# Patient Record
Sex: FEMALE | Race: WHITE | NOT HISPANIC OR LATINO | Employment: OTHER | URBAN - METROPOLITAN AREA
[De-identification: names, ages, dates, MRNs, and addresses within clinical notes are randomized per-mention and may not be internally consistent; named-entity substitution may affect disease eponyms.]

---

## 2017-02-03 ENCOUNTER — HOSPITAL ENCOUNTER (OUTPATIENT)
Dept: MAMMOGRAPHY | Facility: HOSPITAL | Age: 47
Discharge: HOME | End: 2017-02-03
Attending: OBSTETRICS & GYNECOLOGY | Admitting: OBSTETRICS & GYNECOLOGY

## 2017-02-03 DIAGNOSIS — Z12.31 VISIT FOR SCREENING MAMMOGRAM: ICD-10-CM

## 2017-02-03 PROCEDURE — G0202 SCR MAMMO BI INCL CAD: HCPCS

## 2017-02-03 PROCEDURE — 77063 BREAST TOMOSYNTHESIS BI: CPT

## 2017-02-03 PROCEDURE — 77067 SCR MAMMO BI INCL CAD: CPT | Performed by: RADIOLOGY

## 2017-02-03 PROCEDURE — 77063 BREAST TOMOSYNTHESIS BI: CPT | Performed by: RADIOLOGY

## 2017-03-03 ENCOUNTER — TRANSCRIBE ORDERS (OUTPATIENT)
Dept: ADMINISTRATIVE | Facility: HOSPITAL | Age: 47
End: 2017-03-03

## 2017-03-03 DIAGNOSIS — Z12.31 VISIT FOR SCREENING MAMMOGRAM: Primary | ICD-10-CM

## 2018-02-09 ENCOUNTER — HOSPITAL ENCOUNTER (OUTPATIENT)
Dept: MAMMOGRAPHY | Facility: HOSPITAL | Age: 48
Discharge: HOME | End: 2018-02-09
Attending: OBSTETRICS & GYNECOLOGY | Admitting: OBSTETRICS & GYNECOLOGY

## 2018-02-09 DIAGNOSIS — Z12.31 VISIT FOR SCREENING MAMMOGRAM: ICD-10-CM

## 2018-02-09 PROCEDURE — 77063 BREAST TOMOSYNTHESIS BI: CPT | Performed by: RADIOLOGY

## 2018-02-09 PROCEDURE — 77067 SCR MAMMO BI INCL CAD: CPT | Performed by: RADIOLOGY

## 2018-02-09 PROCEDURE — 77063 BREAST TOMOSYNTHESIS BI: CPT

## 2018-02-09 PROCEDURE — 77067 SCR MAMMO BI INCL CAD: CPT

## 2018-12-12 ENCOUNTER — TRANSCRIBE ORDERS (OUTPATIENT)
Dept: ADMINISTRATIVE | Facility: HOSPITAL | Age: 48
End: 2018-12-12

## 2018-12-12 DIAGNOSIS — Z12.31 VISIT FOR SCREENING MAMMOGRAM: Primary | ICD-10-CM

## 2019-03-01 ENCOUNTER — HOSPITAL ENCOUNTER (OUTPATIENT)
Dept: MAMMOGRAPHY | Facility: HOSPITAL | Age: 49
Discharge: HOME | End: 2019-03-01
Attending: OBSTETRICS & GYNECOLOGY | Admitting: OBSTETRICS & GYNECOLOGY

## 2019-03-01 DIAGNOSIS — Z12.31 VISIT FOR SCREENING MAMMOGRAM: ICD-10-CM

## 2019-03-01 PROCEDURE — 77067 SCR MAMMO BI INCL CAD: CPT

## 2019-03-01 PROCEDURE — 77063 BREAST TOMOSYNTHESIS BI: CPT | Performed by: RADIOLOGY

## 2019-03-01 PROCEDURE — 77063 BREAST TOMOSYNTHESIS BI: CPT

## 2019-03-01 PROCEDURE — 77067 SCR MAMMO BI INCL CAD: CPT | Performed by: RADIOLOGY

## 2020-02-03 ENCOUNTER — TRANSCRIBE ORDERS (OUTPATIENT)
Dept: ADMINISTRATIVE | Facility: HOSPITAL | Age: 50
End: 2020-02-03

## 2020-02-03 DIAGNOSIS — Z12.31 VISIT FOR SCREENING MAMMOGRAM: Primary | ICD-10-CM

## 2020-05-06 ENCOUNTER — APPOINTMENT (OUTPATIENT)
Dept: MAMMOGRAPHY | Facility: HOSPITAL | Age: 50
End: 2020-05-06

## 2020-10-12 ENCOUNTER — HOSPITAL ENCOUNTER (OUTPATIENT)
Dept: MAMMOGRAPHY | Facility: HOSPITAL | Age: 50
Discharge: HOME | End: 2020-10-12
Admitting: OBSTETRICS & GYNECOLOGY

## 2020-10-12 DIAGNOSIS — Z12.31 VISIT FOR SCREENING MAMMOGRAM: ICD-10-CM

## 2020-10-12 PROCEDURE — 77063 BREAST TOMOSYNTHESIS BI: CPT

## 2020-10-12 PROCEDURE — 77067 SCR MAMMO BI INCL CAD: CPT

## 2020-10-12 PROCEDURE — 77063 BREAST TOMOSYNTHESIS BI: CPT | Performed by: RADIOLOGY

## 2020-10-12 PROCEDURE — 77067 SCR MAMMO BI INCL CAD: CPT | Performed by: RADIOLOGY

## 2020-11-09 ENCOUNTER — OFFICE VISIT (OUTPATIENT)
Dept: NEUROSURGERY | Facility: CLINIC | Age: 50
End: 2020-11-09

## 2020-11-09 VITALS
WEIGHT: 243.6 LBS | DIASTOLIC BLOOD PRESSURE: 82 MMHG | BODY MASS INDEX: 36.08 KG/M2 | TEMPERATURE: 97.3 F | SYSTOLIC BLOOD PRESSURE: 114 MMHG | HEIGHT: 69 IN

## 2020-11-09 DIAGNOSIS — M51.26 HERNIATED LUMBAR INTERVERTEBRAL DISC: Primary | ICD-10-CM

## 2020-11-09 PROCEDURE — 99203 OFFICE O/P NEW LOW 30 MIN: CPT | Performed by: NEUROLOGICAL SURGERY

## 2020-11-09 RX ORDER — ESTRADIOL 1 MG/1
TABLET ORAL
COMMUNITY

## 2020-11-09 RX ORDER — NABUMETONE 750 MG/1
750 TABLET, FILM COATED ORAL 2 TIMES DAILY
Qty: 60 TABLET | Refills: 0 | Status: SHIPPED | OUTPATIENT
Start: 2020-11-09 | End: 2020-11-09

## 2020-11-09 RX ORDER — VENLAFAXINE 75 MG/1
TABLET ORAL
COMMUNITY
End: 2022-09-15

## 2020-11-09 RX ORDER — METHOCARBAMOL 750 MG/1
750 TABLET, FILM COATED ORAL 2 TIMES DAILY
Qty: 60 TABLET | Refills: 0 | Status: SHIPPED | OUTPATIENT
Start: 2020-11-09 | End: 2020-11-30 | Stop reason: SDUPTHER

## 2020-11-09 RX ORDER — BUSPIRONE HYDROCHLORIDE 5 MG/1
5 TABLET ORAL 3 TIMES DAILY
COMMUNITY

## 2020-11-09 RX ORDER — FEXOFENADINE HCL AND PSEUDOEPHEDRINE HCI 180; 240 MG/1; MG/1
1 TABLET, EXTENDED RELEASE ORAL
COMMUNITY
End: 2021-09-09 | Stop reason: SDUPTHER

## 2020-11-09 RX ORDER — LEVOTHYROXINE SODIUM 25 MCG
50 TABLET ORAL DAILY
COMMUNITY
Start: 2020-09-04 | End: 2021-02-15

## 2020-11-09 RX ORDER — NABUMETONE 750 MG/1
750 TABLET, FILM COATED ORAL 2 TIMES DAILY
Qty: 60 TABLET | Refills: 0 | Status: SHIPPED | OUTPATIENT
Start: 2020-11-09 | End: 2020-11-30 | Stop reason: SDUPTHER

## 2020-11-09 RX ORDER — METHOCARBAMOL 750 MG/1
750 TABLET, FILM COATED ORAL 2 TIMES DAILY
Qty: 60 TABLET | Refills: 0 | Status: SHIPPED | OUTPATIENT
Start: 2020-11-09 | End: 2020-11-09

## 2020-11-09 NOTE — PROGRESS NOTES
Dusty Zepeda  1970  [id number]      Chief Complaint   Patient presents with   • Back Pain     Bilateral   • Leg Pain     RLE   • Numbness     right knee   • Extremity Weakness     right knee       HISTORY OF PRESENT ILLNESS: This is a 50-year-old female with a 6-week history of pain in her back rating to her right leg and lateral aspect of her right knee.  She has had some issues with her knee in the past; has been to physical therapy.  However within the past 6 weeks she developed severe radiculopathy from her back rating to the lateral side of the knee.  It is worse with bending flexing.  She has been recently moving requiring packing and lifting of boxes, etc.  Her symptoms have worsened.    Lumbar MRI was performed and she is referred for neurosurgical consultation.    Past Medical History:   Diagnosis Date   • Arthritis    • Hyperthyroidism    • Low back pain        Past Surgical History:   Procedure Laterality Date   • HYSTERECTOMY     • THYROID LOBECTOMY         Family History   Problem Relation Age of Onset   • Thyroid disease Mother    • Asthma Father    • Breast cancer Neg Hx    • Ovarian cancer Neg Hx        Social History     Socioeconomic History   • Marital status:      Spouse name: Not on file   • Number of children: Not on file   • Years of education: Not on file   • Highest education level: Not on file   Tobacco Use   • Smoking status: Never Smoker   • Smokeless tobacco: Never Used   Substance and Sexual Activity   • Alcohol use: Yes   • Drug use: Never   • Sexual activity: Defer       No Known Allergies      Current Outpatient Medications:   •  busPIRone (BUSPAR) 5 MG tablet, Take 5 mg by mouth 3 (Three) Times a Day., Disp: , Rfl:   •  estradiol (ESTRACE) 1 MG tablet, estradiol 1 mg tablet, Disp: , Rfl:   •  fexofenadine-pseudoephedrine (ALLEGRA-D 24) 180-240 MG per 24 hr tablet, Take 1 tablet by mouth., Disp: , Rfl:   •  Synthroid 25 MCG tablet, 50 mcg Daily., Disp: , Rfl:   •   venlafaxine (EFFEXOR) 75 MG tablet, venlafaxine 75 mg tablet  Take 1 tablet every day by oral route., Disp: , Rfl:     Review of Systems   Constitutional: Negative for appetite change, chills, diaphoresis, fatigue, fever and unexpected weight change.   HENT: Positive for postnasal drip. Negative for congestion, dental problem, drooling, ear discharge, ear pain, facial swelling, hearing loss, mouth sores, nosebleeds, rhinorrhea, sinus pressure, sneezing, sore throat, tinnitus, trouble swallowing and voice change.    Eyes: Negative for photophobia, pain, discharge, redness, itching and visual disturbance.   Respiratory: Negative for apnea, cough, choking, chest tightness, shortness of breath, wheezing and stridor.    Cardiovascular: Negative for chest pain, palpitations and leg swelling.   Gastrointestinal: Negative for abdominal distention, abdominal pain, anal bleeding, blood in stool, constipation, diarrhea, nausea, rectal pain and vomiting.   Endocrine: Negative for cold intolerance, heat intolerance, polydipsia, polyphagia and polyuria.   Genitourinary: Negative for decreased urine volume, difficulty urinating, dysuria, enuresis, flank pain, frequency, genital sores, hematuria and urgency.   Musculoskeletal: Positive for back pain and joint swelling. Negative for arthralgias, gait problem, neck pain and neck stiffness.   Skin: Negative for color change, pallor, rash and wound.   Allergic/Immunologic: Negative for environmental allergies, food allergies and immunocompromised state.   Neurological: Negative for dizziness, tremors, seizures, syncope, facial asymmetry, speech difficulty, weakness, light-headedness, numbness and headaches.   Hematological: Negative for adenopathy. Does not bruise/bleed easily.   Psychiatric/Behavioral: Negative for agitation, behavioral problems, confusion, decreased concentration, dysphoric mood, hallucinations, self-injury, sleep disturbance and suicidal ideas. The patient is not  "nervous/anxious and is not hyperactive.    All other systems reviewed and are negative.      Vitals:    11/09/20 0952   BP: 114/82   BP Location: Right arm   Patient Position: Sitting   Cuff Size: Adult   Temp: 97.3 °F (36.3 °C)   TempSrc: Infrared   Weight: 110 kg (243 lb 9.6 oz)   Height: 175.3 cm (69\")       Neurological Examination:  Mental status/speech: The patient is alert and oriented.  Speech is clear without aphysia or dysarthria.  No overt cognitive deficits.    Cranial nerve examination:    Olfaction: Smell is intact.  Vision: Vision is intact without visual field abnormalities.  Funduscopic examination is normal.  No pupillary irregularity.  Ocular motor examination: The extraocular muscles are intact.  There is no diplopia.  The pupil is round and reactive to both light and accommodation.  There is no nystagmus.  Facial movement/sensation: There is no facial weakness.  Sensation is intact in the first, second, and third divisions of the trigeminal nerve.  The corneal reflex is intact.  Auditory: Hearing is intact to finger rub bilaterally.  Cranial nerves IX, X, XI, XII: Phonation is normal.  No dysphagia.  Tongue is protruded in the midline without atrophy.  The gag reflex is intact.  Shoulder shrug is normal.    Musculoligamentous ligamentous examination: BMI 35.9; weight 243 pounds.  Limited range of motion lumbar spine.  Straight leg raising, Lasègue and flip test negative.  There is no weakness or sensory loss.  The right patellar reflex is diminished as compared to the left which is easily elicitable.  Gait is normal.          Medical Decision Making:     Diagnostic Data Set: Lumbar MRI appears to have mild disc degeneration however at L4-L5 I think she has an extreme lateral disc protrusion on the right.      Assessment: Far lateral disc extrusion L4-L5          Recommendations: Send her to physical therapy; provided prescription of Relafen 750 mg twice daily singular Robaxin-750 milligrams 3 " times daily and will ask her to call me in 2 weeks.  At that time she is not better I would recommend a facet block or epidural steroid injection.  Should that fail she would require a acro endoscopic discectomy L4-L5 from the right.        I greatly appreciate the opportunity to see and evaluate this individual.  If you have questions or concerns regarding issues that I may have overlooked please call me at any time: [phone].  Bakari Sommers M.D.  [facility]  [address] [place] [address]  [phone]

## 2020-11-09 NOTE — PATIENT INSTRUCTIONS
Call Dr. Sommers on a Monday or Tuesday (ask for Lindsey or Liv) and leave a message.     Dr. Sommers will call you back at the end of the day as soon as he can.     [phone] Lindsey    [phone] Liv Sierra    Call 2-3 weeks

## 2020-11-11 NOTE — PROGRESS NOTES
[facility]        Subjective     Pain of the Right Knee      HPI    Dusty Zepeda is a 50 y.o. female who presents with new problem of: right knee pain.  Onset: atraumatic and gradual in nature. The issue has been ongoing for 1.5 month(s). Pain is a 5/10 on the pain scale. Pain is described as burning and stabbing. Associated symptoms include pain, popping, grinding, stiffness and giving way/buckling. The pain is worse with walking, standing and climbing stairs; pain medication and/or NSAID and lying down improve the pain. Previous treatments have included: NSAIDS and physical therapy.    I have reviewed the following portions of the patient's history:History of Present Illness and review of systems.    Patient is here today at the request of Dr. Sommers and Dr. Hernandez for evaluation of right lower extremity pain.  Patient has sciatica and sees Dr. Sommers for that.  Patient also has some lateral sided hip pain that radiates down the leg to the knee.  There is some numbness at the anterolateral aspect of the proximal leg.  Patient has difficulty with kneeling and squatting.  There is cracking and popping as well.  She is here for further evaluation and treatment.  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      Past Medical History:   Diagnosis Date   • Arthritis    • Hyperthyroidism    • Low back pain       Past Surgical History:   Procedure Laterality Date   • HYSTERECTOMY     • THYROID LOBECTOMY        Family History   Problem Relation Age of Onset   • Thyroid disease Mother    • Asthma Father    • Breast cancer Neg Hx    • Ovarian cancer Neg Hx      Social History     Socioeconomic History   • Marital status:      Spouse name: Not on file   • Number of children: Not on file   • Years of education: Not on file   • Highest education level: Not on file    "  Tobacco Use   • Smoking status: Never Smoker   • Smokeless tobacco: Never Used   Substance and Sexual Activity   • Alcohol use: Yes   • Drug use: Never   • Sexual activity: Defer      Current Outpatient Medications on File Prior to Visit   Medication Sig Dispense Refill   • busPIRone (BUSPAR) 5 MG tablet Take 5 mg by mouth 3 (Three) Times a Day.     • estradiol (ESTRACE) 1 MG tablet estradiol 1 mg tablet     • fexofenadine-pseudoephedrine (ALLEGRA-D 24) 180-240 MG per 24 hr tablet Take 1 tablet by mouth.     • methocarbamol (ROBAXIN) 750 MG tablet Take 1 tablet by mouth 2 (two) times a day for 30 days. 60 tablet 0   • nabumetone (RELAFEN) 750 MG tablet Take 1 tablet by mouth 2 (Two) Times a Day. 60 tablet 0   • Synthroid 25 MCG tablet 50 mcg Daily.     • venlafaxine (EFFEXOR) 75 MG tablet venlafaxine 75 mg tablet   Take 1 tablet every day by oral route.       No current facility-administered medications on file prior to visit.       No Known Allergies       Review of Systems   Constitutional: Negative.    HENT: Negative.    Eyes: Negative.    Respiratory: Negative.    Cardiovascular: Negative.    Gastrointestinal: Negative.    Endocrine: Negative.    Genitourinary: Negative.    Musculoskeletal: Positive for arthralgias, back pain and neck stiffness.   Skin: Negative.    Allergic/Immunologic: Negative.    Neurological: Negative.    Hematological: Negative.    Psychiatric/Behavioral: Negative.         I reviewed the patient's chief complaint, history of present illness, review of systems, past medical history, surgical history, family history, social history, medications and allergy list.        Objective      Physical Exam  Pulse 95   Ht 175.3 cm (69.02\")   Wt 110 kg (242 lb 8.1 oz)   SpO2 96%   BMI 35.80 kg/m²     Body mass index is 35.8 kg/m².    General  Mental Status - alert  General Appearance - cooperative, well groomed, not in acute distress  Orientation - Oriented X3  Build & Nutrition - well developed " and well nourished  Posture - normal posture  Gait - normal gait     Integumentary  Global Assessment  Examination of related systems reveals - no lymphadenopathy  Ears:  No abnormality  Nose:  No mucous drainage  General Characteristics  Overall examination of the patient's skin reveals - no rashes, no evidence of scars, no suspicious lesions and no bruises.  Color - normal coloration of skin.  Vascular: Brisk capillary refill in all extremities    Ortho Exam  Peripheral Vascular  Lower Extremity   Edema - None bilaterally    Musculoskeletal  Lower Extremity   Left Hip    Normal range of motion    No crepitus, instability, subluxation or laxity    No known fractures or dislocations   Right Hip    Normal range of motion    No crepitus, instability, subluxation or laxity    No known fractures or dislocations     Inspection and palpation    Tenderness -      Right - over greater trochanter     Swelling - none bilaterally    Tissue tension/texture is pliable and soft bilaterally     Normal warmth bilaterally   Strength and Tone    Left hip flexors - 5/5     Right hip flexors - 5/5   Deformities/Postures/Misalignments/Discrepancies    No leg length discrepancy   Functional Testing    Stinchfield test positive bilaterally    90-90 straight leg raise negative bilaterally    Mary's test:  positive      Peripheral Vascular:    Upper Extremity:   Inspection:  Left--no cyanotic nail beds Right--no cyanotic nail beds   Bilateral:  Pink nail beds with brisk capillary refill   Palpation:  Bilateral radial pulse normal    Musculoskeletal:  Global Assessment:  Overall assessment of Lower Extremity Muscle Strength and Tone:  Right quadriceps--5/5   Right hamstrings--5/5       Right tibialis anterior--5/5  Right gastroc-soleus--5/5  Right EHL --5/5    Lower Extremity:  Knee/Patella:  No digital clubbing or cyanosis.    Examination of right knee reveals:  Normal deep tendon reflexes, coordination, strength, tone, sensation.  No known  fractures or deformities.    Inspection and Palpation:  Right knee:  Tenderness:  Over the medial and lateral joint line and moderate severity  Effusion:  1+  Crepitus:  Positive  Pulses:  2+  Ecchymosis:  None  Warmth:  None     ROM:  Right:  Extension: 5    Flexion:120    Instability:    Right:  Lachman Test:  Negative, Varus stress test negative, Valgus stress test negative    Deformities/Malalignments/Discrepancies:    Left:  No deformities   Right:  Genu Varum    Functional Testing:  Michelle's test:  Negative  Patella grind test:  Positive  Q-angle:  normal            Imaging/Studies  Imaging Results (Last 24 Hours)     Procedure Component Value Units Date/Time    XR Knee 4+ View Right [[id number]] Resulted: 11/12/20 0900     Updated: 11/12/20 0900    Narrative:      Knee X-Ray    Indication: Pain    Study:  Upright AP, Skiers, Lateral, and Sunrise views of Right knee(s)    Comparison: None    Findings:    Patient appears to have mild degenerative changes in the medial   compartment.    There are mild degenerative changes in the lateral compartment.    There are moderate early severe changes in the patellofemoral compartment.      Patient has overall neutral to slight varus alignment.        Impression:   Mild lateral and medial compartment and moderate to early severe   patellofemoral compartment degnerative changes of the knee                 Assessment    Assessment:  1. Right knee pain, unspecified chronicity    2. Primary osteoarthritis of right knee    3. Trochanteric bursitis of right hip        Plan:  1. Continue over-the-counter medication as needed for discomfort  2. Right lower extremity pain with trochanteric bursitis--observe for now.  Certainly lateral sided knee pain can be a manifestation of this condition.  I recommended observation for now and modalities to the trochanteric bursa in about 6 weeks if she does not get any relief from her knee injection  3. Right knee  arthritis--patellofemoral--diagnostic therapeutic injection will be given into the right knee today.  Follow-up in 6 weeks and we will assess efficacy.  If she is no better, consider modalities to the trochanteric bursa.    Procedure Note:    I discussed with the patient the potential benefits of performing a therapeutic injections as well as potential risks including but not limited to infection, swelling, pain, bleeding, bruising, nerve/vessel damage, skin color changes, transient elevation in blood glucose levels, and fat atrophy. After informed consent and after the areas were prepped with chlorhexadine soap, ethyl chloride was used to numb the skin. Via the anterolateral approach, 3mL of 1% lidocaine followed by 40mg of Kenalog were each injected into the right knee joint. The patient tolerated the procedure well. There were no complications. A sterile dressing was placed over the injection sites.          Nadir Moe MD  11/12/20  09:11 SINDHU Mike disclaimer:  Much of this encounter note is an electronic transcription/translation of spoken language to printed text. The electronic translation of spoken language may permit erroneous, or at times, nonsensical words or phrases to be inadvertently transcribed; Although I have reviewed the note for such errors, some may still exist.

## 2020-11-12 ENCOUNTER — OFFICE VISIT (OUTPATIENT)
Dept: ORTHOPEDIC SURGERY | Facility: CLINIC | Age: 50
End: 2020-11-12

## 2020-11-12 VITALS — OXYGEN SATURATION: 96 % | WEIGHT: 242.51 LBS | HEART RATE: 95 BPM | BODY MASS INDEX: 35.92 KG/M2 | HEIGHT: 69 IN

## 2020-11-12 DIAGNOSIS — M70.61 TROCHANTERIC BURSITIS OF RIGHT HIP: ICD-10-CM

## 2020-11-12 DIAGNOSIS — M17.11 PRIMARY OSTEOARTHRITIS OF RIGHT KNEE: ICD-10-CM

## 2020-11-12 DIAGNOSIS — M25.561 RIGHT KNEE PAIN, UNSPECIFIED CHRONICITY: Primary | ICD-10-CM

## 2020-11-12 PROCEDURE — 20610 DRAIN/INJ JOINT/BURSA W/O US: CPT | Performed by: ORTHOPAEDIC SURGERY

## 2020-11-12 PROCEDURE — 99204 OFFICE O/P NEW MOD 45 MIN: CPT | Performed by: ORTHOPAEDIC SURGERY

## 2020-11-12 RX ORDER — TRIAMCINOLONE ACETONIDE 40 MG/ML
40 INJECTION, SUSPENSION INTRA-ARTICULAR; INTRAMUSCULAR
Status: COMPLETED | OUTPATIENT
Start: 2020-11-12 | End: 2020-11-12

## 2020-11-12 RX ORDER — LIDOCAINE HYDROCHLORIDE 10 MG/ML
3 INJECTION, SOLUTION EPIDURAL; INFILTRATION; INTRACAUDAL; PERINEURAL
Status: COMPLETED | OUTPATIENT
Start: 2020-11-12 | End: 2020-11-12

## 2020-11-12 RX ADMIN — LIDOCAINE HYDROCHLORIDE 3 ML: 10 INJECTION, SOLUTION EPIDURAL; INFILTRATION; INTRACAUDAL; PERINEURAL at 09:11

## 2020-11-12 RX ADMIN — TRIAMCINOLONE ACETONIDE 40 MG: 40 INJECTION, SUSPENSION INTRA-ARTICULAR; INTRAMUSCULAR at 09:11

## 2020-11-12 NOTE — PROGRESS NOTES
Procedure   Large Joint Arthrocentesis: R knee  Date/Time: 11/12/2020 9:11 AM  Consent given by: patient  Site marked: site marked  Timeout: Immediately prior to procedure a time out was called to verify the correct patient, procedure, equipment, support staff and site/side marked as required   Supporting Documentation  Indications: pain   Procedure Details  Location: knee - R knee  Preparation: Patient was prepped and draped in the usual sterile fashion  Needle size: 25 G  Approach: anterolateral  Medications administered: 40 mg triamcinolone acetonide 40 MG/ML; 3 mL lidocaine PF 1% 1 %  Patient tolerance: patient tolerated the procedure well with no immediate complications

## 2020-11-30 ENCOUNTER — TELEPHONE (OUTPATIENT)
Dept: NEUROSURGERY | Facility: CLINIC | Age: 50
End: 2020-11-30

## 2020-11-30 RX ORDER — METHOCARBAMOL 750 MG/1
750 TABLET, FILM COATED ORAL 2 TIMES DAILY
Qty: 60 TABLET | Refills: 0 | Status: SHIPPED | OUTPATIENT
Start: 2020-11-30 | End: 2020-12-30

## 2020-11-30 RX ORDER — NABUMETONE 750 MG/1
750 TABLET, FILM COATED ORAL 2 TIMES DAILY
Qty: 60 TABLET | Refills: 0 | Status: SHIPPED | OUTPATIENT
Start: 2020-11-30 | End: 2021-01-13 | Stop reason: SDUPTHER

## 2020-11-30 NOTE — TELEPHONE ENCOUNTER
----- Message from Caren Sierra sent at 11/30/2020  9:29 AM EST -----  Regarding: Check In  Contact: [phone]  Dr. Sommers,  Ms. Zepeda called to let you know she is feeling better. She says she's still taking her medicine twice a day,like you recommended, and it's helping. She said she will also be starting physical therapy this Friday. I let her know you would call her back at the end of the day.    Thanks,  Liv

## 2021-01-05 ENCOUNTER — OFFICE VISIT (OUTPATIENT)
Dept: ORTHOPEDIC SURGERY | Facility: CLINIC | Age: 51
End: 2021-01-05

## 2021-01-05 VITALS — OXYGEN SATURATION: 96 % | WEIGHT: 237 LBS | HEIGHT: 69 IN | BODY MASS INDEX: 35.1 KG/M2 | HEART RATE: 79 BPM

## 2021-01-05 DIAGNOSIS — M53.3 SACRO-ILIAC PAIN: Primary | ICD-10-CM

## 2021-01-05 DIAGNOSIS — M17.11 PRIMARY OSTEOARTHRITIS OF RIGHT KNEE: ICD-10-CM

## 2021-01-05 DIAGNOSIS — M70.61 TROCHANTERIC BURSITIS OF RIGHT HIP: ICD-10-CM

## 2021-01-05 DIAGNOSIS — M25.561 RIGHT KNEE PAIN, UNSPECIFIED CHRONICITY: ICD-10-CM

## 2021-01-05 PROCEDURE — 99213 OFFICE O/P EST LOW 20 MIN: CPT | Performed by: ORTHOPAEDIC SURGERY

## 2021-01-05 NOTE — PROGRESS NOTES
"    [facility] [place]        Subjective     CC: Follow-up (6 week f/u; Primary osteoarthritis of right knee and Trochanteric bursitis of right hip (cortisone injection right knee 11/12/20))      MODESTA Zepeda is a 50 y.o. female.  Patient returns the office today for follow-up of her right knee and right hip pain.  At her last visit, she was injected in the right knee for her arthritis and this has helped her.  She continues to struggle with right SI joint pain.  In the past, she has been injected there by Dr. Puckett in [place].  This helped her tremendously.    Overall, patient's symptoms are better in the knee but worse in the hip.    ROS:    Constiutional:Pt denies fever, chills, nausea, or vomiting.  MSK:as above        Objective      Past Medical History  Past Medical History:   Diagnosis Date   • Arthritis    • Hyperthyroidism    • Low back pain          Physical Exam  Pulse 79   Ht 175.3 cm (69.02\")   Wt 108 kg (237 lb)   SpO2 96%   BMI 34.98 kg/m²     Body mass index is 34.98 kg/m².    Patient is well nourished and well developed.        Ortho Exam  Peripheral Vascular  Lower Extremity   Edema - None bilaterally    Musculoskeletal  Lower Extremity   Left Hip    Normal range of motion    No crepitus, instability, subluxation or laxity    No known fractures or dislocations   Right Hip    Normal range of motion    No crepitus, instability, subluxation or laxity    No known fractures or dislocations     Inspection and palpation    Tenderness -      Right - over greater trochanter but very mild.  Maximally tender over the SI joint.    Swelling - none bilaterally    Tissue tension/texture is pliable and soft bilaterally     Normal warmth bilaterally   Strength and Tone    Left hip flexors - 5/5     Right hip flexors - 5/5   Deformities/Postures/Misalignments/Discrepancies    No leg length discrepancy   Functional Testing    Stincfield test positive bilaterally    90-90 straight " leg raise negative bilaterally    Mary's test: Mildly positive            Imaging/Labs/EMG Reviewed:  Imaging Results (Last 24 Hours)     ** No results found for the last 24 hours. **            Assessment    Assessment:  1. Sacro-iliac pain    2. Right knee pain, unspecified chronicity    3. Primary osteoarthritis of right knee    4. Trochanteric bursitis of right hip        Plan:  1. Recommend over the counter anti-inflammatories for pain and/or swelling  2. Right knee arthritis--improved after steroid injection.  Observe for now.  We discussed further modalities down the road.  Her mother receives Visco injections and they have lasted many years for her.  Patient has never had Visco.  We can discuss it further down the road if the corticosteroid loses efficacy.  3. Trochanteric bursitis right hip with sacroiliac dysfunction and pain--trochanteric bursitis appears mild.  Patient symptoms seem to be more consistent with sacroiliac pain rather than true trochanteric bursitis.  She has gotten relief in the past from an injection in the SI joint.  Referral will be made to Dr. Meeks for SI joint injection.  Follow-up with me as needed going forward.      Nadir Moe MD  01/05/21  09:37 EST      Dragon disclaimer:  Much of this encounter note is an electronic transcription/translation of spoken language to printed text. The electronic translation of spoken language may permit erroneous, or at times, nonsensical words or phrases to be inadvertently transcribed; Although I have reviewed the note for such errors, some may still exist.

## 2021-01-13 DIAGNOSIS — M51.26 HERNIATED LUMBAR INTERVERTEBRAL DISC: Primary | ICD-10-CM

## 2021-01-13 RX ORDER — METHOCARBAMOL 750 MG/1
750 TABLET, FILM COATED ORAL 2 TIMES DAILY
Qty: 180 TABLET | Refills: 0 | Status: SHIPPED | OUTPATIENT
Start: 2021-01-13 | End: 2021-02-12

## 2021-01-13 RX ORDER — NABUMETONE 750 MG/1
750 TABLET, FILM COATED ORAL 2 TIMES DAILY
Qty: 180 TABLET | Refills: 0 | Status: SHIPPED | OUTPATIENT
Start: 2021-01-13 | End: 2021-07-21 | Stop reason: SDUPTHER

## 2021-01-13 NOTE — TELEPHONE ENCOUNTER
Provider:  Matthieu  Caller: Mignon Cardona  Time of call: 11:36    Phone #:  [phone]  Surgery:  no  Surgery Date:    Last visit:   11/09/20  Next visit:   None  SOFIA:         Reason for call:     Patient request 90 day supply of Methocarbamol and Nabumetone.

## 2021-02-04 PROBLEM — M53.3 SACROILIAC JOINT DYSFUNCTION OF RIGHT SIDE: Status: ACTIVE | Noted: 2021-02-04

## 2021-02-04 PROBLEM — M17.11 OSTEOARTHRITIS OF RIGHT KNEE: Status: ACTIVE | Noted: 2021-02-04

## 2021-02-04 PROBLEM — M51.16 LUMBAR DISC HERNIATION WITH RADICULOPATHY: Status: ACTIVE | Noted: 2021-02-04

## 2021-02-04 PROBLEM — M70.61 GREATER TROCHANTERIC BURSITIS OF RIGHT HIP: Status: ACTIVE | Noted: 2021-02-04

## 2021-02-04 PROBLEM — M48.061 LUMBAR FORAMINAL STENOSIS: Status: ACTIVE | Noted: 2021-02-04

## 2021-02-09 ENCOUNTER — OFFICE VISIT (OUTPATIENT)
Dept: PAIN MEDICINE | Facility: CLINIC | Age: 51
End: 2021-02-09

## 2021-02-09 VITALS
DIASTOLIC BLOOD PRESSURE: 78 MMHG | WEIGHT: 246 LBS | SYSTOLIC BLOOD PRESSURE: 120 MMHG | HEIGHT: 69 IN | BODY MASS INDEX: 36.43 KG/M2

## 2021-02-09 DIAGNOSIS — E66.8 MODERATE OBESITY: ICD-10-CM

## 2021-02-09 DIAGNOSIS — M48.061 LUMBAR FORAMINAL STENOSIS: ICD-10-CM

## 2021-02-09 DIAGNOSIS — M17.11 OSTEOARTHRITIS OF RIGHT KNEE, UNSPECIFIED OSTEOARTHRITIS TYPE: ICD-10-CM

## 2021-02-09 DIAGNOSIS — M51.16 LUMBAR DISC HERNIATION WITH RADICULOPATHY: Primary | ICD-10-CM

## 2021-02-09 DIAGNOSIS — M15.9 OSTEOARTHRITIS OF MULTIPLE JOINTS, UNSPECIFIED OSTEOARTHRITIS TYPE: ICD-10-CM

## 2021-02-09 DIAGNOSIS — M51.16 LUMBAR DISC HERNIATION WITH RADICULOPATHY: ICD-10-CM

## 2021-02-09 PROBLEM — E66.9 MODERATE OBESITY: Status: ACTIVE | Noted: 2021-02-09

## 2021-02-09 PROCEDURE — 99204 OFFICE O/P NEW MOD 45 MIN: CPT | Performed by: ANESTHESIOLOGY

## 2021-02-09 RX ORDER — ME-TETRAHYDROFOLATE/B12/HRB236 1-1-500 MG
1 CAPSULE ORAL DAILY
Qty: 90 CAPSULE | Refills: 1 | Status: SHIPPED | OUTPATIENT
Start: 2021-02-09 | End: 2021-09-09 | Stop reason: SDUPTHER

## 2021-02-15 RX ORDER — LEVOTHYROXINE SODIUM 25 MCG
TABLET ORAL
Qty: 135 TABLET | Refills: 1 | Status: SHIPPED | OUTPATIENT
Start: 2021-02-15 | End: 2021-03-09 | Stop reason: DRUGHIGH

## 2021-02-24 ENCOUNTER — LAB (OUTPATIENT)
Dept: LAB | Facility: HOSPITAL | Age: 51
End: 2021-02-24

## 2021-02-24 ENCOUNTER — TRANSCRIBE ORDERS (OUTPATIENT)
Dept: LAB | Facility: HOSPITAL | Age: 51
End: 2021-02-24

## 2021-02-24 DIAGNOSIS — M25.552 LEFT HIP PAIN: ICD-10-CM

## 2021-02-24 DIAGNOSIS — M25.551 RIGHT HIP PAIN: ICD-10-CM

## 2021-02-24 DIAGNOSIS — N95.1 SYMPTOMATIC MENOPAUSAL OR FEMALE CLIMACTERIC STATES: ICD-10-CM

## 2021-02-24 DIAGNOSIS — Z00.00 ROUTINE GENERAL MEDICAL EXAMINATION AT A HEALTH CARE FACILITY: Primary | ICD-10-CM

## 2021-02-24 DIAGNOSIS — Z00.00 ROUTINE GENERAL MEDICAL EXAMINATION AT A HEALTH CARE FACILITY: ICD-10-CM

## 2021-02-24 DIAGNOSIS — I10 ESSENTIAL HYPERTENSION, MALIGNANT: ICD-10-CM

## 2021-02-24 LAB
ALBUMIN SERPL-MCNC: 4.3 G/DL (ref 3.5–5.2)
ALBUMIN/GLOB SERPL: 1.5 G/DL
ALP SERPL-CCNC: 73 U/L (ref 39–117)
ALT SERPL W P-5'-P-CCNC: 16 U/L (ref 1–33)
ANION GAP SERPL CALCULATED.3IONS-SCNC: 7 MMOL/L (ref 5–15)
AST SERPL-CCNC: 14 U/L (ref 1–32)
BILIRUB SERPL-MCNC: 0.4 MG/DL (ref 0–1.2)
BUN SERPL-MCNC: 15 MG/DL (ref 6–20)
BUN/CREAT SERPL: 17.4 (ref 7–25)
CALCIUM SPEC-SCNC: 9.5 MG/DL (ref 8.6–10.5)
CHLORIDE SERPL-SCNC: 103 MMOL/L (ref 98–107)
CHOLEST SERPL-MCNC: 208 MG/DL (ref 0–200)
CHROMATIN AB SERPL-ACNC: <10 IU/ML (ref 0–14)
CO2 SERPL-SCNC: 29 MMOL/L (ref 22–29)
CREAT SERPL-MCNC: 0.86 MG/DL (ref 0.57–1)
CRP SERPL-MCNC: 0.11 MG/DL (ref 0.01–0.5)
DEPRECATED RDW RBC AUTO: 40 FL (ref 37–54)
ERYTHROCYTE [DISTWIDTH] IN BLOOD BY AUTOMATED COUNT: 11.8 % (ref 12.3–15.4)
ERYTHROCYTE [SEDIMENTATION RATE] IN BLOOD: 15 MM/HR (ref 0–30)
ESTRADIOL SERPL HS-MCNC: 49.8 PG/ML
GFR SERPL CREATININE-BSD FRML MDRD: 69 ML/MIN/1.73
GLOBULIN UR ELPH-MCNC: 2.8 GM/DL
GLUCOSE SERPL-MCNC: 97 MG/DL (ref 65–99)
HBA1C MFR BLD: 5.4 % (ref 4.8–5.6)
HCT VFR BLD AUTO: 42 % (ref 34–46.6)
HDLC SERPL-MCNC: 39 MG/DL (ref 40–60)
HGB BLD-MCNC: 14.3 G/DL (ref 12–15.9)
LDLC SERPL CALC-MCNC: 152 MG/DL (ref 0–100)
LDLC/HDLC SERPL: 3.85 {RATIO}
MCH RBC QN AUTO: 31.7 PG (ref 26.6–33)
MCHC RBC AUTO-ENTMCNC: 34 G/DL (ref 31.5–35.7)
MCV RBC AUTO: 93.1 FL (ref 79–97)
PLATELET # BLD AUTO: 282 10*3/MM3 (ref 140–450)
PMV BLD AUTO: 9.8 FL (ref 6–12)
POTASSIUM SERPL-SCNC: 5 MMOL/L (ref 3.5–5.2)
PROT SERPL-MCNC: 7.1 G/DL (ref 6–8.5)
RBC # BLD AUTO: 4.51 10*6/MM3 (ref 3.77–5.28)
SODIUM SERPL-SCNC: 139 MMOL/L (ref 136–145)
TRIGL SERPL-MCNC: 94 MG/DL (ref 0–150)
VLDLC SERPL-MCNC: 17 MG/DL (ref 5–40)
WBC # BLD AUTO: 4.14 10*3/MM3 (ref 3.4–10.8)

## 2021-02-24 PROCEDURE — 85027 COMPLETE CBC AUTOMATED: CPT | Performed by: OBSTETRICS & GYNECOLOGY

## 2021-02-24 PROCEDURE — 36415 COLL VENOUS BLD VENIPUNCTURE: CPT | Performed by: OBSTETRICS & GYNECOLOGY

## 2021-02-24 PROCEDURE — 86431 RHEUMATOID FACTOR QUANT: CPT

## 2021-02-24 PROCEDURE — 82670 ASSAY OF TOTAL ESTRADIOL: CPT | Performed by: OBSTETRICS & GYNECOLOGY

## 2021-02-24 PROCEDURE — 83036 HEMOGLOBIN GLYCOSYLATED A1C: CPT | Performed by: OBSTETRICS & GYNECOLOGY

## 2021-02-24 PROCEDURE — 80061 LIPID PANEL: CPT | Performed by: OBSTETRICS & GYNECOLOGY

## 2021-02-24 PROCEDURE — 85652 RBC SED RATE AUTOMATED: CPT | Performed by: OBSTETRICS & GYNECOLOGY

## 2021-02-24 PROCEDURE — 80053 COMPREHEN METABOLIC PANEL: CPT | Performed by: OBSTETRICS & GYNECOLOGY

## 2021-02-24 PROCEDURE — 86141 C-REACTIVE PROTEIN HS: CPT

## 2021-02-26 ENCOUNTER — APPOINTMENT (OUTPATIENT)
Dept: PREADMISSION TESTING | Facility: HOSPITAL | Age: 51
End: 2021-02-26

## 2021-03-02 ENCOUNTER — HOSPITAL ENCOUNTER (OUTPATIENT)
Dept: NUTRITION | Facility: HOSPITAL | Age: 51
Setting detail: RECURRING SERIES
Discharge: HOME | End: 2021-03-02

## 2021-03-02 VITALS — WEIGHT: 246 LBS | BODY MASS INDEX: 36.43 KG/M2 | HEIGHT: 69 IN

## 2021-03-02 DIAGNOSIS — M51.16 LUMBAR DISC HERNIATION WITH RADICULOPATHY: Primary | ICD-10-CM

## 2021-03-02 PROCEDURE — 97802 MEDICAL NUTRITION INDIV IN: CPT

## 2021-03-02 NOTE — CONSULTS
Adult Outpatient Nutrition  Assessment/PES    Patient Name:  Dusty Zepeda  YOB: 1970  MRN: [id number]    Assessment Date:  3/2/2021    Comments:      This medical referred consult was provided via zoom as patient was unable to attend an in-office appointment today due to the COVID-19 crisis. Consent for treatment was given verbally. RD spent a total of 60 minutes with patient today.      Patient is a 50 year old female seen today for an initial nutrition visit. She shared she is seeking support for weight loss. Patient reported she started experiencing arthritis and back pain in the summer of 2020. She reported she has been experiencing a lot of depression and anxiety due to not being able to do the things she is used to doing. 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 Patient reported she has an injection scheduled and is hoping this will help with her pain. She stated her and her  eat out about four times per week. She shared that sometimes they will have ice cream for dinner. She stated she feels she has been emotionally eating now more than she has ever before in the past. Patient shared she has worked with a  before in the past and did not lose any weight. She also shared she often gets frustrated when she does not see results after working very hard. Patient shared she would like to talk about what she should eat to promote weight loss and prevent weight gain. Health literacy assessment not completed via tele-health. Patient reported no barriers to learning.     RD supported patient with her frustrations and affirmed her feelings. RD explained many factors that may make weight loss difficult, including eating too much, eating too little, aging, decreased activity, and stress. RD encouraged patient to make changes slowly and at her own pace. RD shared  "research that 95% of people that go on a diet regain the weight plus more. We discussed the importance of establishing sustainable healthy habits. RD used the plate method to demonstrate the components of a balanced meal. We discussed the importance of eating consistent and balanced meals to keep our metabolism functioning normally. RD encouraged patient to have the mindset of \"What can I add to this meal to make it more balanced or more nutritious?\" Rather than \"What do I need to avoid or restrict?\" RD and patient brain stormed several breakfast, lunch, and dinner ideas for the patient. We discussed the importance of adequate hydration and RD offered ideas to make water more flavorful, such as adding fruit or trying a seltzer water. Patient stated she would like to start incorporating activity into her lifestyle and is considering trying pilates. RD also discussed methods to overcome emotional eating such as finding alternative healthy coping mechanism, such as journaling, reading, paiting, listening to music, etc. We also discussed the benefits of seeking support from a mental healthy provider. Patient stated she would consider this. Patient stated she has no further questions at this time. She formed her own goals and stated she feels confident she can accomplish them. RD mailed patient sample menus, recipe ideas, and other educational handouts. RD encouraged patient to contact her prior to our next visit.     Goals:  1) Eat a balanced meal three meals per day five days per week.  2) Drink 32 fl oz water daily    Total of 60 minutes spent with patient on nutrition counseling. Education based on Academy of Dietetics and Nutrition guidelines. Patient was provided with RD's contact information. Follow up visit is scheduled for 04/06/21 at 9:15 am. Thank you for this referral.    General Info     Row Name 03/02/21 1042       Today's Session    Person(s) attending today's session  Patient     Services Used " "Today?  No       General Information    How Well Do You Speak English?  very well    Preferred Language  English    Are you able to read and write English?  Yes    Lives With  spouse          Anthropometrics     Row Name 03/02/21 1047 03/02/21 1042       Anthropometrics    Height  175.3 cm (69\")  175.3 cm (69\")    Weight  --  112 kg (246 lb)       Ideal Body Weight (IBW)    Ideal Body Weight (IBW) (kg)  66.43  66.43    % Ideal Body Weight  --  167.96       Body Mass Index (BMI)    BMI (kg/m2)  --  36.4        Nutritional Info/Activity     Row Name 03/02/21 1042       Nutritional Information    Have you had weight changes?  Yes    Describe weight changes  Patient reported she gained about 30 lbs since retiring    Have you tried to lose weight before?  Yes    List programs tried, date, and success  Exercsing - no success    What is your motivation to lose weight?  Health    Food Allergies  -- none    Supplemental Drinks/Foods/Additives  rheumatoid    History of eating disorder?  No    What cultural diet influences are important for you to follow?  none    Do you have difficulty chewing food?  No    Functional Status  able to prepare meals;able to purchase food    List any food cravings/trigger foods you have  sweets    List any food aversions  eggs, brussel sprouts    Food Behaviors  Stress eater;Comfort eater    How often do you eat out and where?  3-4 times per week at [employer]    Do you use Food Assistance programs (WIC, food stamps, food bank)?  no    Do you need information about Food Assistance programs?  no    How many diet sodas do you drink per day?  2    What is the biggest challenge you have with your diet?  Portions;Poor choices;Eating out    What type of support do you currently use to help you with your health issues?  none    Enter everything you can remember eating in the last 24 hours (1 day) Breakfast: coffee with creamer, protein bar    Lunch: garden salad with roasted chicken, " "ranch dressing    Dinner: 1 1/2 slices [employer] pizza    Beverages: 24 fl oz diet pepsi, 16 fl oz water       Eating Environment    Eating environment  Alone;Family       Physical Activity    Are you currently involved in an activity/exercise program?   No        Home Nutrition Report     Row Name 03/02/21 1042          Home Nutrition Report    Supplemental Drinks/Foods/Additives  rheumatoid         Estimated/Assessed Needs     Row Name 03/02/21 1047 03/02/21 1042       Calculation Measurements    Weight Used For Calculations  112 kg (246 lb 14.6 oz)  --    Height  175.3 cm (69\")  175.3 cm (69\")       Estimated/Assessed Needs    Additional Documentation  [place]-St. Jeor Equation (Group)  --       [place]-St. Jeor Equation    RMR ([place]-St. Jeor Equation)  1794.375  --    [place]-St. Jeor Activity Factors  1.2  --    Activity Factors ([place]-St. Jeor)  2153.25  --          Labs/Tests/Procedures/Meds     Row Name 03/02/21 1048          Labs/Procedures/Meds    Lab Results Reviewed  reviewed     Lab Results Comments  (2/24/21): Cholesterol: 208 mg/dL; HDL Cholesterol: 39 mg/dL; LDL Cholesterol: 152 mg/dL             Problem/Interventions:  Problem 1     Row Name 03/02/21 1049          Nutrition Diagnoses Problem 1    Problem 1  Overweight/Obesity     Etiology (related to)  -- lifestyle     Signs/Symptoms (evidenced by)  BMI     BMI  35 - 39.9                 Intervention Goal     Row Name 03/02/21 1049          Intervention Goal    General  Meet nutritional needs for age/condition     Weight  Appropriate weight loss             Education/Evaluation     Row Name 03/02/21 1050          Education    Education  Advised regarding habits/behavior     Advised Regarding Habits/Behavior  Activity;Snacks;Appropriate beverage;Appropriate portions;Increased nutrient density;Eating out;Eating pattern;Food prep;Food choices        Monitor/Evaluation    Monitor  PO intake;Weight     Education Follow-up  Reinforce PRN     "       Electronically signed by:  Julianne Pryor RD  03/02/21 10:50 EST

## 2021-03-07 ENCOUNTER — APPOINTMENT (OUTPATIENT)
Dept: PREADMISSION TESTING | Facility: HOSPITAL | Age: 51
End: 2021-03-07

## 2021-03-07 PROCEDURE — U0004 COV-19 TEST NON-CDC HGH THRU: HCPCS

## 2021-03-07 PROCEDURE — C9803 HOPD COVID-19 SPEC COLLECT: HCPCS

## 2021-03-08 LAB — SARS-COV-2 RNA RESP QL NAA+PROBE: NOT DETECTED

## 2021-03-09 ENCOUNTER — LAB (OUTPATIENT)
Dept: LAB | Facility: HOSPITAL | Age: 51
End: 2021-03-09

## 2021-03-09 ENCOUNTER — OFFICE VISIT (OUTPATIENT)
Dept: ENDOCRINOLOGY | Facility: CLINIC | Age: 51
End: 2021-03-09

## 2021-03-09 VITALS
BODY MASS INDEX: 36.14 KG/M2 | HEART RATE: 64 BPM | TEMPERATURE: 97.5 F | SYSTOLIC BLOOD PRESSURE: 124 MMHG | WEIGHT: 244 LBS | HEIGHT: 69 IN | DIASTOLIC BLOOD PRESSURE: 64 MMHG | OXYGEN SATURATION: 98 %

## 2021-03-09 DIAGNOSIS — E03.9 HYPOTHYROIDISM (ACQUIRED): ICD-10-CM

## 2021-03-09 DIAGNOSIS — E04.1 NON-TOXIC UNINODULAR GOITER: ICD-10-CM

## 2021-03-09 DIAGNOSIS — E03.9 HYPOTHYROIDISM (ACQUIRED): Primary | ICD-10-CM

## 2021-03-09 PROCEDURE — 84443 ASSAY THYROID STIM HORMONE: CPT

## 2021-03-09 PROCEDURE — 99213 OFFICE O/P EST LOW 20 MIN: CPT | Performed by: INTERNAL MEDICINE

## 2021-03-09 RX ORDER — TRIAMTERENE AND HYDROCHLOROTHIAZIDE 37.5; 25 MG/1; MG/1
TABLET ORAL DAILY
COMMUNITY
Start: 2021-02-24

## 2021-03-09 RX ORDER — LEVOTHYROXINE SODIUM 0.05 MG/1
50 TABLET ORAL DAILY
Start: 2021-03-09 | End: 2021-07-23

## 2021-03-09 NOTE — PROGRESS NOTES
"     Office Note      Date: 2021  Patient Name: Dusty Zepeda  MRN: [id number]  : 1970    Chief Complaint   Patient presents with   • Thyroid Problem       History of Present Illness:   Dusty Zepeda is a 50 y.o. female who presents for Thyroid Problem    She remains on T4 50mcg qd. She reports taking this correctly and regularly. She denies sxs of hypo- or hyperthyroidism, aside from some fatigue. She denies any changes in her neck. She denies compressive sxs.    MNG - partial thyroidectomy ; FNA  - atypical but ThyraMIR highly likely benign    Subjective      Review of Systems:   Review of Systems   Constitutional: Positive for fatigue.   Cardiovascular: Negative.    Gastrointestinal: Negative.    Endocrine: Negative.        The following portions of the patient's history were reviewed and updated as appropriate: allergies, current medications, past family history, past medical history, past social history, past surgical history and problem list.    Objective     Visit Vitals  /64 (BP Location: Left arm, Patient Position: Sitting, Cuff Size: Adult)   Pulse 64   Temp 97.5 °F (36.4 °C) (Infrared)   Ht 175.3 cm (69\")   Wt 111 kg (244 lb)   SpO2 98%   BMI 36.03 kg/m²       Physical Exam:  Physical Exam  Constitutional:       Appearance: Normal appearance.   Neurological:      Mental Status: She is alert.         Labs:    TSH  No results found for: TSHBASE     Free T4  No results found for: FREET4    T3  No results found for: [id number]      TPO  No results found for: THYROIDAB    TG AB  No results found for: THGAB    TG  No results found for: THYROGLB    CBC w/DIFF  Lab Results   Component Value Date    WBC 4.14 2021    RBC 4.51 2021    HGB 14.3 2021    HCT 42.0 2021    MCV 93.1 2021    MCH 31.7 2021    MCHC 34.0 2021    RDW 11.8 (L) 2021    RDWSD 40.0 2021    MPV 9.8 2021     2021           Assessment / Plan  "     Assessment & Plan:  Diagnoses and all orders for this visit:    1. Hypothyroidism (acquired) (Primary)  Assessment & Plan:  Check TSH today.      Orders:  -     TSH; Future    2. Non-toxic uninodular goiter  Assessment & Plan:  Plan for neck u/s in about 18 months.      Other orders  -     levothyroxine (SYNTHROID, LEVOTHROID) 50 MCG tablet; Take 1 tablet by mouth Daily.      Return in about 6 months (around 9/9/2021) for Recheck with TSH.    Don Torres MD   03/09/2021

## 2021-03-10 ENCOUNTER — OUTSIDE FACILITY SERVICE (OUTPATIENT)
Dept: PAIN MEDICINE | Facility: CLINIC | Age: 51
End: 2021-03-10

## 2021-03-10 LAB — TSH SERPL DL<=0.05 MIU/L-ACNC: 2.32 UIU/ML (ref 0.27–4.2)

## 2021-03-10 PROCEDURE — 64483 NJX AA&/STRD TFRM EPI L/S 1: CPT | Performed by: ANESTHESIOLOGY

## 2021-03-10 PROCEDURE — 99152 MOD SED SAME PHYS/QHP 5/>YRS: CPT | Performed by: ANESTHESIOLOGY

## 2021-03-11 ENCOUNTER — TELEPHONE (OUTPATIENT)
Dept: PAIN MEDICINE | Facility: CLINIC | Age: 51
End: 2021-03-11

## 2021-03-11 NOTE — TELEPHONE ENCOUNTER
diagnostic and therapeutic right L4-L5 transforaminal epidural steroid injection 03/10/2021.    Spoke with patient to see how she is doing since her procedure, she reports she is doing well. She does c/o some soreness.   Patient aware of follow-up appointment.   Reminder card placed in outgoing mail.

## 2021-04-06 ENCOUNTER — HOSPITAL ENCOUNTER (OUTPATIENT)
Dept: NUTRITION | Facility: HOSPITAL | Age: 51
Setting detail: RECURRING SERIES
Discharge: HOME | End: 2021-04-06

## 2021-04-06 NOTE — PROGRESS NOTES
Adult Outpatient Nutrition    Patient Name:  Dusty Zepeda  YOB: 1970  MRN: [id number]    Assessment Date:  4/6/2021    Comments:      This medical referred consult was provided via zoom as patient was unable to attend an in-office appointment today due to the COVID-19 crisis. Consent for treatment was given verbally. RD spent a total of 30 minutes with patient today.     Patient is a 50 year old female seen today for a nutrition follow up visit related to weight loss. Patient reported she has been doing well since our initial nutrition visit. She shared she has been able to increase her consumption of vegetables, has started walking 3-4 miles about 4-5 days per week, and has been eating consistent and balanced meals. Patient is not sure if she has lost any weight. She shared her daughter has been visiting the past 3 weeks, so she felt she had eaten more than she usually would during this time. Her daughter has recently left and she feels she is starting to get back into her routine. 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 She is feeling happier and more satisfied in all areas in her life. Patient is experiencing hip pain and is scheduled to see pain management. Patient stated she has no particular questions or topics she would like to discuss today.    24-Hour Dietary Recall:  Breakfast: 1 greek yogurt cup  Lunch: large salad with chicken  Snack: 1/2 cup cottage cheese with fruit  Dinner: 4 oz steak + small baked potato  Beverages: 32 fl oz water, 1 cup coffee, 12 fl oz diet soda, 16 fl oz unsweetened tea  Typical day? YES    Patient stated she is feeling full and satisfied with her meals. She reported she is not experiencing any cravings and believed the behaviors she has changed are sustainable. She reported these are all things she used to do before, but she and her  got out of the habit once their  daughter moved out. Patient feels her diet has room to incorporate more vegetables. She stated she often forgets how easy vegetables are to prepare. RD discussed several ideas to help the patient include more vegetables in her diet using both fresh and frozen products. RD also recommended patient include more sources of omega 3 fatty acids in her diet. We discussed the benefits for heart health and reducing inflammation. Patient stated she would like to incorporate more fish, nuts, seeds, and avocados into her diet. Patient stated she is not interested in scheduling another nutrition visit at this time, but will call back as needed. RD encouraged patient to reach out with any future needs.      Goal Completion:  1) Eat a balanced meal three meals per day five days per week - 100%  2) Drink 32 fl oz water daily - 100%    Total of 30 minutes spent with patient on nutrition counseling. Education based on Academy of Dietetics and Nutrition guidelines. Patient was provided with RD's contact information. Thank you for this referral.        Electronically signed by:  Julianne Pryor RD  04/06/21 09:30 EDT

## 2021-04-12 NOTE — PROGRESS NOTES
"Chief Complaint: \"I feel so much better since the injection, I am walking almost two miles. My right hip and right buttock seem to bother me more.\"      History of Present Illness:   Patient: Ms. Dusty Zepeda, 50 y.o. female originally referred by Dr. Nadir Moe in consultation for chronic intractable lower back and lower extremity pain. Patient reports a several month history of progressive lower back and lower extremity pain, which began without incident.  Patient was last seen on March 10, 2021, when she underwent diagnostic and therapeutic right L4-L5 transforaminal epidural steroid injection, from which she reports experiencing 50-75% pain relief and functional improvement that is ongoing.  She did not begin physical therapy despite medical advice, but remains quite active.  She has began consultations with [facility] weight loss as recommended.  She returns today for post procedure follow-up and evaluation.  Pain Description: intermittent exacerbation, described as aching and throbbing sensation.   Radiation of Pain: The pain radiates from the right SI joint into the gluteal region and right hip  Pain intensity today: 2/10  Average pain intensity last week: 3/10  Pain intensity ranges from: 1/10 to 5/10  Aggravating factors: Pain increases with bending, twisting. She no longer experiences significant pain while standing and walking.  Patient describes neurogenic claudication.    Alleviating factors: Pain decreases with sitting down, lying down   Associated Symptoms:   Patient denies numbness or weakness in the lower extremities.   Patient denies any new bladder or bowel problems.   Patient denies difficulties with her balance or recent falls.     Review of previous therapies and additional medical records:  Dusty Zepeda has already failed the following measures, including:   Conservative Measures: Oral analgesics, topical analgesics, ice, heat, chiropractic therapy, massage, physical " therapy   Interventional Measures: Right SI joint injections, RT knee injection with Dr. Aguilar  03/10/2021: DxTx right L4-L5 transforaminal epidural steroid injection  Surgical Measures: No history of lumbar spine or hip surgery  Dusty Zepeda underwent orthopedic consultation with Dr. Nadir Moe on 01/05/2021 and was found not to be a surgical candidate  Dusty Zepeda underwent neurosurgical consultation with Dr. Domingo Sommers on 11/9/2020, and was found to be a potential surgical candidate for microendoscopic discectomy from right L4-L5   Dusty Zepeda presents with significant comorbidities including osteoarthritis, hypothyroidism, anxiety and depression  In terms of current analgesics, Dusty Zepeda takes: Nabumetone, methocarbamol.  Patient also takes BuSpar and venlafaxine  I have reviewed [facility] Report #[id number] consistent with medication reconciliation.  SOAPP: Low Risk    Global Pain Scale 02-09 2021 04-13 2021         Pain 10 6         Feelings 7 7         Clinical outcomes 4 0         Activities 3 5         GPS Total: 24 18           Review of Diagnostic Studies:   MRI of the lumbar spine without contrast October 5, 2020.  Vertebral body heights and alignment are preserved.  There is diffuse mild desiccation and facet hypertrophy.  Axial imaging:  L2-L3: Disc bulge, facet hypertrophy results in mild canal stenosis and moderate bilateral foraminal stenosis  L3-L4: Disc bulge, facet hypertrophy results in moderate central canal stenosis and bilateral neuroforaminal stenosis  L4-L5: Disc bulge and facet hypertrophy results in moderate central canal stenosis and right neuroforaminal stenosis.  Mild left neuroforaminal stenosis  L5-S1: Disc bulge, facet hypertrophy, central disc protrusion contribute to moderate central canal stenosis and mild bilateral neuroforaminal stenosis  X-rays of the right knee November 12, 2020.  I have reviewed the images.  Mild degenerative changes in the  medial and lateral compartment.  Moderate to severe changes in the patellofemoral compartment.  Slight varus alignment.    Review of Systems   Musculoskeletal: Positive for arthralgias, back pain and joint swelling.   Psychiatric/Behavioral: The patient is nervous/anxious.    All other systems reviewed and are negative.        Patient Active Problem List   Diagnosis   • Lumbar disc herniation with radiculopathy   • Lumbar foraminal stenosis   • Sacroiliac joint dysfunction of right side   • Greater trochanteric bursitis of right hip   • Osteoarthritis of right knee   • Moderate obesity   • Hypothyroidism (acquired)   • Non-toxic uninodular goiter       Past Medical History:   Diagnosis Date   • Arthritis    • Hyperthyroidism    • Hypothyroidism    • Low back pain    • Non-toxic uninodular goiter          Past Surgical History:   Procedure Laterality Date   • HYSTERECTOMY     • THYROID LOBECTOMY     • THYROIDECTOMY, PARTIAL           Family History   Problem Relation Age of Onset   • Thyroid disease Mother    • Hypothyroidism Mother    • No Known Problems Father    • Thyroid disease Sister    • Breast cancer Neg Hx    • Ovarian cancer Neg Hx          Social History     Socioeconomic History   • Marital status:      Spouse name: Not on file   • Number of children: Not on file   • Years of education: Not on file   • Highest education level: Not on file   Tobacco Use   • Smoking status: Never Smoker   • Smokeless tobacco: Never Used   Vaping Use   • Vaping Use: Never used   Substance and Sexual Activity   • Alcohol use: Yes     Comment: seldom   • Drug use: Never   • Sexual activity: Defer           Current Outpatient Medications:   •  busPIRone (BUSPAR) 5 MG tablet, Take 5 mg by mouth 3 (Three) Times a Day., Disp: , Rfl:   •  Dietary Management Product (Rheumate) capsule, Take 1 capsule by mouth Daily., Disp: 90 capsule, Rfl: 1  •  estradiol (ESTRACE) 1 MG tablet, estradiol 1 mg tablet, Disp: , Rfl:   •   "fexofenadine-pseudoephedrine (ALLEGRA-D 24) 180-240 MG per 24 hr tablet, Take 1 tablet by mouth., Disp: , Rfl:   •  levothyroxine (SYNTHROID, LEVOTHROID) 50 MCG tablet, Take 1 tablet by mouth Daily., Disp: , Rfl:   •  nabumetone (RELAFEN) 750 MG tablet, Take 1 tablet by mouth 2 (Two) Times a Day., Disp: 180 tablet, Rfl: 0  •  triamterene-hydrochlorothiazide (MAXZIDE-25) 37.5-25 MG per tablet, , Disp: , Rfl:   •  venlafaxine (EFFEXOR) 75 MG tablet, venlafaxine 75 mg tablet  Take 1 tablet every day by oral route., Disp: , Rfl:       No Known Allergies      /90 (BP Location: Left arm)   Pulse 67   Temp 97.5 °F (36.4 °C)   Resp 14   Ht 175.3 cm (69.02\")   Wt 111 kg (244 lb 9.6 oz)   SpO2 97%   BMI 36.10 kg/m²       Physical Exam:  Constitutional: Patient appears well-developed, well-nourished, well-hydrated  HEENT: Head: Normocephalic and atraumatic  Eyes: Conjunctivae and lids are normal  Pupils: Equal, round, reactive to light  Neck: Trachea normal. Neck supple. No JVD present.   Pulmonary: No increased work of breathing or signs of respiratory distress. Auscultation of lungs: Clear to auscultation.   Cardiovascular: Normal rate and rhythm, normal S1 and S2, no murmurs.   Peripheral vascular exam: Femoral: right 2+, left 2+. Posterior tibialis: right 2+ and left 2+. Dorsalis pedis: right 2+ and left 2+. No edema.   Musculoskeletal   Gait and station: Gait evaluation demonstrated a normal gait, able to walk on heels, toes, tandem walking   Lumbar spine: Passive and active range of motion are significantly improved with minimal pain. Extension and rotation of the lumbar spine to the right increased and reproduced pain. Lumbar facet joint loading maneuvers are mildly positive.  Parker test, Gaenslen's test, thigh thrust test, SI compression test, Yeoman's test are mildly positive on the right   Piriformis maneuvers are negative   Palpation of the bilateral greater trochanter, reveals tenderness on the " right  Examination of the Iliotibial band: unrevealing   Hip joints: The range of motion of the hip joints is almost full and without pain, she has groin pain at the end of ROM on the right   Neurological:   Patient is alert and oriented to person, place, and time.   Speech: Normal.   Cortical function: Normal mental status.   Cranial nerves 2-12: intact.   Reflex Scores:  Right patellar: 1+  Left patellar: 1+  Right Achilles: 1+  Left Achilles: 1+  Motor strength: 5/5  Motor Tone: Normal .   Involuntary movements: None.   Superficial/Primitive Reflexes: Primitive reflexes were absent.   Right David: Absent  Left David: Absent  Right ankle clonus: Absent  Left ankle clonus: Absent   Babinsky: Absent  Long tract signs: Negative. Straight leg raising test: Negative. Femoral stretch sign: Negative.   Sensory exam: Intact to light touch, intact pain and temperature sensation, intact vibration sensation and normal proprioception.   Coordination: Normal finger to nose and heel to shin. Normal balance and negative Romberg's sign   Skin and subcutaneous tissue: Skin is warm and intact. No rash noted. No cyanosis.   Psychiatric: Judgment and insight: Normal. Orientation to person, place and time: Normal. Recent and remote memory: Intact. Mood and affect: Normal.     ASSESSMENT:   1. Sacroiliac joint dysfunction of right side    2. Greater trochanteric bursitis of right hip    3. Lumbar disc herniation with radiculopathy    4. Lumbar foraminal stenosis    5. Osteoarthritis of right knee, unspecified osteoarthritis type    6. Osteoarthritis of multiple joints, unspecified osteoarthritis type    7. Moderate obesity        PLAN/MEDICAL DECISION MAKING: Patient reports a several month history of progressive lower back and lower extremity pain.  She had previously underwent some injections in her right sacroiliac joint with Dr. Puckett about 10 years ago with relief.  Patient underwent orthopedic consultation with Dr. Schmitz  Harry Moe on 01/05/2021 and was found not to be a surgical candidate.  Patient underwent right knee injections with significant relief. MRI of the lumbar spine revealed L4-L5: Disc bulge and facet hypertrophy results in moderate central canal stenosis and right neuroforaminal stenosis. Mild left neuroforaminal stenosis. Dusty Zepeda underwent neurosurgical consultation with Dr. Domingo Sommers on 11/9/2020, and was found to be a potential surgical candidate for microendoscopic discectomy from right L4-L5 if failure to obtain pain relief with conservative and interventional pain management measures. Her lower back symptoms have significantly improved. She in now walking almost two miles per day. She does continue to have symptoms of sacroiliac joint dysfunction, an upon physical examination right greater trochanteric bursitis. Patient has failed to obtain pain relief with conservative measures, as referenced above. I have reviewed all available patient's medical records as well as previous therapies as referenced above. I had a lengthy conversation with Ms. Dusty Zepeda regarding her chronic pain condition and potential therapeutic options including risks, benefits, alternative therapies, to name a few. Therefore, I have proposed the following plan:  1. Pharmacological measures: Reviewed and discussed;   A. Patient takes Nabumetone, methocarbamol.  Patient also takes BuSpar and venlafaxine  B. Continue Rheumate one tablet twice daily  2. Interventional pain management measures: Patient will be scheduled for diagnostic and therapeutic right sacroiliac joint injection combined with a right greater trochanteric bursa injection. If her lower back symptoms recur we could repeat right L4-L5 transforaminal epidural steroid injection. We may repeat epidural depending on patient's outcome.  Other options for treatment of her mechanical lower back pain would include the possibility of lumbar facet joint injections  versus diagnostic lumbar MBBs followed by lumbar RFTC.  If she continues to struggle with pain, then, we could consider provocative lumbar discography.  Patient will follow-up with Dr. Sommers thereafter for right L4-L5 microendoscopic discectomy   3. Long-term rehabilitation efforts:  A. The patient does not have a history of falls. I did complete a risk assessment for falls  B. Patient will start a comprehensive physical therapy program at [facility] for water therapy, therapeutic exercise, core strengthening, gait and balance training, neurodynamics, ASTYM, myofascial release, cupping and dry needling   C. Start an exercise program such as yoga, Pilates, water therapy and swimming  D. Contrast therapy: Apply ice-packs for 15-20 minutes, followed by heating pads for 15-20 minutes to affected area   E. Continue visits with [facility]. Patient counseled on the importance of weight loss to help with overall health and pain control. Patient instructed to attempt weight loss.    4. The patient has been instructed to contact my office with any questions or difficulties. The patient understands the plan and agrees to proceed accordingly.        Patient Care Team:  DavidMerlin anthony MD as PCP - General  David, Merlin Pickens MD as PCP - Family Medicine  David, Merlin Pickens MD as Referring Physician (Internal Medicine)     No orders of the defined types were placed in this encounter.        Future Appointments   Date Time Provider Department Center   4/13/2021  3:00 PM Yue Terrazas APRN [facility]   9/9/2021 12:45 PM Don Torres MD [facility] None         EDMUND [place]     EMR Dragon/Transcription disclaimer:  Much of this encounter note is an electronic transcription of spoken language to printed text. Electronic transcription of spoken language may permit erroneous, or at times, nonsensical words or phrases to be inadvertently  transcribed. Although I have reviewed the note for such errors, some may still exist.

## 2021-04-13 ENCOUNTER — OFFICE VISIT (OUTPATIENT)
Dept: PAIN MEDICINE | Facility: CLINIC | Age: 51
End: 2021-04-13

## 2021-04-13 VITALS
DIASTOLIC BLOOD PRESSURE: 90 MMHG | BODY MASS INDEX: 36.23 KG/M2 | TEMPERATURE: 97.5 F | HEIGHT: 69 IN | SYSTOLIC BLOOD PRESSURE: 120 MMHG | RESPIRATION RATE: 14 BRPM | WEIGHT: 244.6 LBS | OXYGEN SATURATION: 97 % | HEART RATE: 67 BPM

## 2021-04-13 DIAGNOSIS — M70.61 GREATER TROCHANTERIC BURSITIS OF RIGHT HIP: ICD-10-CM

## 2021-04-13 DIAGNOSIS — M17.11 OSTEOARTHRITIS OF RIGHT KNEE, UNSPECIFIED OSTEOARTHRITIS TYPE: ICD-10-CM

## 2021-04-13 DIAGNOSIS — E66.8 MODERATE OBESITY: ICD-10-CM

## 2021-04-13 DIAGNOSIS — M53.3 SACROILIAC JOINT DYSFUNCTION OF RIGHT SIDE: ICD-10-CM

## 2021-04-13 DIAGNOSIS — M51.16 LUMBAR DISC HERNIATION WITH RADICULOPATHY: ICD-10-CM

## 2021-04-13 DIAGNOSIS — M48.061 LUMBAR FORAMINAL STENOSIS: ICD-10-CM

## 2021-04-13 DIAGNOSIS — M53.3 SACROILIAC JOINT DYSFUNCTION OF RIGHT SIDE: Primary | ICD-10-CM

## 2021-04-13 DIAGNOSIS — M15.9 OSTEOARTHRITIS OF MULTIPLE JOINTS, UNSPECIFIED OSTEOARTHRITIS TYPE: ICD-10-CM

## 2021-04-13 PROCEDURE — 99213 OFFICE O/P EST LOW 20 MIN: CPT | Performed by: NURSE PRACTITIONER

## 2021-04-21 DIAGNOSIS — M53.3 SACROILIAC JOINT DYSFUNCTION OF RIGHT SIDE: Primary | ICD-10-CM

## 2021-04-30 ENCOUNTER — APPOINTMENT (OUTPATIENT)
Dept: PREADMISSION TESTING | Facility: HOSPITAL | Age: 51
End: 2021-04-30

## 2021-04-30 PROCEDURE — C9803 HOPD COVID-19 SPEC COLLECT: HCPCS

## 2021-04-30 PROCEDURE — U0004 COV-19 TEST NON-CDC HGH THRU: HCPCS

## 2021-05-01 LAB — SARS-COV-2 RNA NOSE QL NAA+PROBE: NOT DETECTED

## 2021-05-03 ENCOUNTER — OUTSIDE FACILITY SERVICE (OUTPATIENT)
Dept: PAIN MEDICINE | Facility: CLINIC | Age: 51
End: 2021-05-03

## 2021-05-03 PROCEDURE — 27096 INJECT SACROILIAC JOINT: CPT | Performed by: ANESTHESIOLOGY

## 2021-05-03 PROCEDURE — 20610 DRAIN/INJ JOINT/BURSA W/O US: CPT | Performed by: ANESTHESIOLOGY

## 2021-05-04 ENCOUNTER — TELEPHONE (OUTPATIENT)
Dept: PAIN MEDICINE | Facility: CLINIC | Age: 51
End: 2021-05-04

## 2021-05-04 NOTE — TELEPHONE ENCOUNTER
Spoke with patient about procedure, states that she is doing great. Reminded of follow up appointment, reminder card in the mail.

## 2021-06-03 ENCOUNTER — LAB (OUTPATIENT)
Dept: LAB | Facility: HOSPITAL | Age: 51
End: 2021-06-03

## 2021-06-03 ENCOUNTER — TRANSCRIBE ORDERS (OUTPATIENT)
Dept: LAB | Facility: HOSPITAL | Age: 51
End: 2021-06-03

## 2021-06-03 DIAGNOSIS — E78.00 PURE HYPERCHOLESTEROLEMIA: Primary | ICD-10-CM

## 2021-06-03 LAB
CHOLEST SERPL-MCNC: 170 MG/DL (ref 0–200)
HDLC SERPL-MCNC: 31 MG/DL (ref 40–60)
LDLC SERPL CALC-MCNC: 116 MG/DL (ref 0–100)
LDLC/HDLC SERPL: 3.65 {RATIO}
TRIGL SERPL-MCNC: 129 MG/DL (ref 0–150)
VLDLC SERPL-MCNC: 23 MG/DL (ref 5–40)

## 2021-06-03 PROCEDURE — 80061 LIPID PANEL: CPT | Performed by: OBSTETRICS & GYNECOLOGY

## 2021-06-03 PROCEDURE — 36415 COLL VENOUS BLD VENIPUNCTURE: CPT | Performed by: OBSTETRICS & GYNECOLOGY

## 2021-07-15 RX ORDER — LEVOTHYROXINE SODIUM 25 MCG
50 TABLET ORAL DAILY
Qty: 180 TABLET | Refills: 0 | Status: SHIPPED | OUTPATIENT
Start: 2021-07-15 | End: 2021-07-26 | Stop reason: SDUPTHER

## 2021-07-15 NOTE — TELEPHONE ENCOUNTER
Chart indicates she is on 50 mcg daily now? Is she on brand synthroid? Need to clarify with pt as dose/sig are different than most recent note and what's in chart.

## 2021-07-20 NOTE — PROGRESS NOTES
"Chief Complaint: \"I am doing ok, I have been going to a place specializing in stretches.\"      History of Present Illness:   Patient: Ms. Dusty Zepeda, 50 y.o. female originally referred by Dr. Nadir Moe in consultation for chronic intractable lower back and lower extremity pain. Patient reports a several month history of lower back and lower extremity pain, which began without incident.  We last saw her on May 3, 2021, when she underwent diagnostic and therapeutic right sacroiliac joint injection combined with a right greater trochanteric bursa injection, from which she reports experiencing some additional pain.  Additionally on March 10, 2021, she underwent diagnostic and therapeutic right L4-L5 transforaminal epidural steroid injection, that she reported provided her with 50 to 75% pain relief that was ongoing at the time of her follow-up visit with me.  She remains quite active, walking at least 2 miles daily.  She also tells me she has been going to a new facility that specializes in stretching, which she has found some significant relief with.  She has began consultations with [facility] weight loss as recommended.  She returns today for post procedure follow-up and evaluation.  Pain Description: intermittent exacerbation, described as aching and throbbing sensation.   Radiation of Pain: The pain primarily remains in the right side of her lower back, and at times will radiate from the right SI joint into the gluteal region and right hip  Pain intensity today: 3/10  Average pain intensity last week: 3/10  Pain intensity ranges from: 2/10 to 6/10  Aggravating factors: Pain increases with bending, twisting. She no longer experiences significant pain while standing and walking.     Alleviating factors: Pain decreases with sitting down, lying down   Associated Symptoms:   Patient denies numbness or weakness in the lower extremities.   Patient denies any new bladder or bowel problems.   Patient " denies difficulties with her balance or recent falls.     Review of previous therapies and additional medical records:  Dusty Zepeda has already failed the following measures, including:   Conservative Measures: Oral analgesics, topical analgesics, ice, heat, chiropractic therapy, massage, physical therapy   Interventional Measures: Right SI joint injections, RT knee injection with Dr. Aguilar  03/10/2021: DxTx right L4-L5 transforaminal epidural steroid injection  05/03/2021: DxTx right sacroiliac joint injection combined with a right greater trochanteric bursa injection  Surgical Measures: No history of lumbar spine or hip surgery  Dusty Zepeda underwent orthopedic consultation with Dr. Nadir Moe on 01/05/2021 and was found not to be a surgical candidate  Dusty Zepeda underwent neurosurgical consultation with Dr. Domingo Sommers on 11/9/2020, and was found to be a potential surgical candidate for microendoscopic discectomy from right L4-L5   Dusty Zepeda presents with significant comorbidities including osteoarthritis, hypothyroidism, anxiety and depression  In terms of current analgesics, Dusty Zepeda takes: Nabumetone, methocarbamol.  Patient also takes BuSpar and venlafaxine  I have reviewed [facility] Report #[id number] consistent with medication reconciliation.  SOAPP: Low Risk    Global Pain Scale 02-09  2021 04-13  2021 07-21 2021        Pain 10 6 8        Feelings 7 7 2        Clinical outcomes 4 0 2        Activities 3 5 5        GPS Total: 24 18 17          Review of Diagnostic Studies:   MRI of the lumbar spine without contrast October 5, 2020.  Vertebral body heights and alignment are preserved.  There is diffuse mild desiccation and facet hypertrophy.  Axial imaging:  L2-L3: Disc bulge, facet hypertrophy results in mild canal stenosis and moderate bilateral foraminal stenosis  L3-L4: Disc bulge, facet hypertrophy results in moderate central canal stenosis and bilateral  neuroforaminal stenosis  L4-L5: Disc bulge and facet hypertrophy results in moderate central canal stenosis and right neuroforaminal stenosis.  Mild left neuroforaminal stenosis  L5-S1: Disc bulge, facet hypertrophy, central disc protrusion contribute to moderate central canal stenosis and mild bilateral neuroforaminal stenosis  X-rays of the right knee November 12, 2020.  I have reviewed the images.  Mild degenerative changes in the medial and lateral compartment.  Moderate to severe changes in the patellofemoral compartment.  Slight varus alignment.    Review of Systems   Musculoskeletal: Positive for arthralgias, back pain, joint swelling and myalgias.   Allergic/Immunologic: Positive for environmental allergies.   Psychiatric/Behavioral: The patient is nervous/anxious.    All other systems reviewed and are negative.        Patient Active Problem List   Diagnosis   • Lumbar disc herniation with radiculopathy   • Lumbar foraminal stenosis   • Sacroiliac joint dysfunction of right side   • Greater trochanteric bursitis of right hip   • Osteoarthritis of right knee   • Moderate obesity   • Hypothyroidism (acquired)   • Non-toxic uninodular goiter       Past Medical History:   Diagnosis Date   • Arthritis    • Hyperthyroidism    • Hypothyroidism    • Low back pain    • Non-toxic uninodular goiter          Past Surgical History:   Procedure Laterality Date   • HYSTERECTOMY     • THYROID LOBECTOMY     • THYROIDECTOMY, PARTIAL           Family History   Problem Relation Age of Onset   • Thyroid disease Mother    • Hypothyroidism Mother    • No Known Problems Father    • Thyroid disease Sister    • Breast cancer Neg Hx    • Ovarian cancer Neg Hx          Social History     Socioeconomic History   • Marital status:      Spouse name: Not on file   • Number of children: Not on file   • Years of education: Not on file   • Highest education level: Not on file   Tobacco Use   • Smoking status: Never Smoker   • Smokeless  "tobacco: Never Used   Vaping Use   • Vaping Use: Never used   Substance and Sexual Activity   • Alcohol use: Yes     Comment: seldom   • Drug use: Never   • Sexual activity: Defer           Current Outpatient Medications:   •  busPIRone (BUSPAR) 5 MG tablet, Take 5 mg by mouth 3 (Three) Times a Day., Disp: , Rfl:   •  estradiol (ESTRACE) 1 MG tablet, estradiol 1 mg tablet, Disp: , Rfl:   •  fexofenadine (ALLEGRA) 180 MG tablet, Take 180 mg by mouth Daily., Disp: , Rfl:   •  levothyroxine (SYNTHROID, LEVOTHROID) 50 MCG tablet, Take 1 tablet by mouth Daily., Disp: , Rfl:   •  methocarbamol (ROBAXIN) 750 MG tablet, Take 1 tablet by mouth 2 (Two) Times a Day As Needed for Muscle Spasms., Disp: 180 tablet, Rfl: 1  •  nabumetone (RELAFEN) 750 MG tablet, Take 1 tablet by mouth 2 (Two) Times a Day., Disp: 180 tablet, Rfl: 0  •  triamterene-hydrochlorothiazide (MAXZIDE-25) 37.5-25 MG per tablet, , Disp: , Rfl:   •  venlafaxine (EFFEXOR) 75 MG tablet, venlafaxine 75 mg tablet  Take 1 tablet every day by oral route., Disp: , Rfl:   •  Dietary Management Product (Rheumate) capsule, Take 1 capsule by mouth Daily., Disp: 90 capsule, Rfl: 1  •  fexofenadine-pseudoephedrine (ALLEGRA-D 24) 180-240 MG per 24 hr tablet, Take 1 tablet by mouth., Disp: , Rfl:   •  Synthroid 25 MCG tablet, Take 2 tablets by mouth Daily for 90 days., Disp: 180 tablet, Rfl: 0      No Known Allergies      /78 (BP Location: Left arm, Patient Position: Sitting, Cuff Size: Adult)   Pulse 71   Temp 96.2 °F (35.7 °C)   Ht 175.3 cm (69\")   Wt 111 kg (245 lb 6.4 oz)   SpO2 95%   BMI 36.24 kg/m²       Physical Exam:  Constitutional: Patient appears well-developed, well-nourished, well-hydrated  HEENT: Head: Normocephalic and atraumatic  Eyes: Conjunctivae and lids are normal  Pupils: Equal, round, reactive to light  Neck: Trachea normal. Neck supple. No JVD present.   Pulmonary: No increased work of breathing or signs of respiratory distress. " Auscultation of lungs: Clear to auscultation.   Cardiovascular: Normal rate and rhythm, normal S1 and S2, no murmurs.   Peripheral vascular exam: Femoral: right 2+, left 2+. Posterior tibialis: right 2+ and left 2+. Dorsalis pedis: right 2+ and left 2+. No edema.   Musculoskeletal   Gait and station: Gait evaluation demonstrated a normal gait, able to walk on heels, toes, tandem walking   Lumbar spine: Passive and active range of motion are significantly improved with minimal pain. Extension and rotation of the lumbar spine to the right increased and reproduced some pain. Lumbar facet joint loading maneuvers are mildly positive.  Parker test, Gaenslen's test, thigh thrust test, SI compression test, Yeoman's test are negative  Piriformis maneuvers are negative   Palpation of the bilateral greater trochanter, reveals tenderness on the right  Examination of the Iliotibial band: unrevealing   Hip joints: The range of motion of the hip joints is almost full and without pain, she has groin pain at the end of ROM on the right   Neurological:   Patient is alert and oriented to person, place, and time.   Speech: Normal.   Cortical function: Normal mental status.   Cranial nerves 2-12: intact.   Reflex Scores:  Right patellar: 1+  Left patellar: 1+  Right Achilles: 1+  Left Achilles: 1+  Motor strength: 5/5  Motor Tone: Normal .   Involuntary movements: None.   Superficial/Primitive Reflexes: Primitive reflexes were absent.   Right David: Absent  Left David: Absent  Right ankle clonus: Absent  Left ankle clonus: Absent   Babinsky: Absent  Long tract signs: Negative. Straight leg raising test: Negative. Femoral stretch sign: Negative.   Sensory exam: Intact to light touch, intact pain and temperature sensation, intact vibration sensation and normal proprioception.   Coordination: Normal finger to nose and heel to shin. Normal balance and negative Romberg's sign   Skin and subcutaneous tissue: Skin is warm and intact. No  rash noted. No cyanosis.   Psychiatric: Judgment and insight: Normal. Orientation to person, place and time: Normal. Recent and remote memory: Intact. Mood and affect: Normal.     ASSESSMENT:   1. Sacroiliac joint dysfunction of right side    2. Greater trochanteric bursitis of right hip    3. Lumbar disc herniation with radiculopathy    4. Lumbar foraminal stenosis    5. Osteoarthritis of right knee, unspecified osteoarthritis type    6. Moderate obesity    7. Herniated lumbar intervertebral disc        PLAN/MEDICAL DECISION MAKING: Patient reports a several month history of progressive lower back and lower extremity pain.  She had previously underwent some injections in her right sacroiliac joint with Dr. Puckett about 10 years ago with relief.  Patient underwent orthopedic consultation with Dr. Nadir Moe on 01/05/2021 and was found not to be a surgical candidate.  Patient underwent right knee injections with significant relief. MRI of the lumbar spine revealed L4-L5: Disc bulge and facet hypertrophy results in moderate central canal stenosis and right neuroforaminal stenosis. Mild left neuroforaminal stenosis. Dusty Zepeda underwent neurosurgical consultation with Dr. Domingo Sommers on 11/9/2020, and was found to be a potential surgical candidate for microendoscopic discectomy from right L4-L5 if failure to obtain pain relief with conservative and interventional pain management measures. Her lower back symptoms have significantly improved. She in now walking almost two miles per day.  She has found some significant relief with a facility that utilizes and specializes in stretching.  At this point, she will continue conservatively, and follow-up with me on a as needed basis.  Patient has failed to obtain pain relief with conservative measures, as referenced above. I have reviewed all available patient's medical records as well as previous therapies as referenced above. I had a lengthy conversation  with Ms. Dusty Zepeda regarding her chronic pain condition and potential therapeutic options including risks, benefits, alternative therapies, to name a few. Therefore, I have proposed the following plan:  1. Pharmacological measures: Reviewed and discussed;   A. Patient takes Nabumetone, methocarbamol.  Patient also takes BuSpar and venlafaxine  B. Continue Rheumate one tablet twice daily  2. Interventional pain management measures: None indicated at this time.  Follow-up on as-needed basis.  If her pain recurs may consider repeating right sacroiliac joint injection combined with a right greater trochanteric bursa injection versus if her lower back symptoms recur we could repeat right L4-L5 transforaminal epidural steroid injection. We may repeat epidural depending on patient's outcome.  Other options for treatment of her mechanical lower back pain would include the possibility of lumbar facet joint injections versus diagnostic lumbar MBBs followed by lumbar RFTC.  If she continues to struggle with pain, then, we could consider provocative lumbar discography.  Patient will follow-up with Dr. Sommers thereafter for right L4-L5 microendoscopic discectomy   3. Long-term rehabilitation efforts:  A. The patient does not have a history of falls. I did complete a risk assessment for falls  B. Patient will start a comprehensive physical therapy program at [facility] for water therapy, therapeutic exercise, core strengthening, gait and balance training, neurodynamics, ASTYM, myofascial release, cupping and dry needling if pain recurs  C. Start an exercise program such as yoga, Pilates, water therapy and swimming  D. Contrast therapy: Apply ice-packs for 15-20 minutes, followed by heating pads for 15-20 minutes to affected area   E. Continue visits with [facility]. Patient counseled on the importance of weight loss to help with overall health and pain control. Patient  instructed to attempt weight loss.    4. The patient has been instructed to contact my office with any questions or difficulties. The patient understands the plan and agrees to proceed accordingly.        Patient Care Team:  Pam Calero MD as PCP - General (General Practice)  David, Merlin Pickens MD as PCP - Family Medicine  David, Merlin Pickens MD as Referring Physician (Internal Medicine)     New Medications Ordered This Visit   Medications   • nabumetone (RELAFEN) 750 MG tablet     Sig: Take 1 tablet by mouth 2 (Two) Times a Day.     Dispense:  180 tablet     Refill:  0   • methocarbamol (ROBAXIN) 750 MG tablet     Sig: Take 1 tablet by mouth 2 (Two) Times a Day As Needed for Muscle Spasms.     Dispense:  180 tablet     Refill:  1         Future Appointments   Date Time Provider Department Center   9/9/2021 12:45 PM Don Torres MD [facility]         [facility]     EMR Dragon/Transcription disclaimer:  Much of this encounter note is an electronic transcription of spoken language to printed text. Electronic transcription of spoken language may permit erroneous, or at times, nonsensical words or phrases to be inadvertently transcribed. Although I have reviewed the note for such errors, some may still exist.

## 2021-07-21 ENCOUNTER — OFFICE VISIT (OUTPATIENT)
Dept: PAIN MEDICINE | Facility: CLINIC | Age: 51
End: 2021-07-21

## 2021-07-21 VITALS
HEIGHT: 69 IN | BODY MASS INDEX: 36.35 KG/M2 | WEIGHT: 245.4 LBS | SYSTOLIC BLOOD PRESSURE: 115 MMHG | TEMPERATURE: 96.2 F | DIASTOLIC BLOOD PRESSURE: 78 MMHG | HEART RATE: 71 BPM | OXYGEN SATURATION: 95 %

## 2021-07-21 DIAGNOSIS — M51.16 LUMBAR DISC HERNIATION WITH RADICULOPATHY: ICD-10-CM

## 2021-07-21 DIAGNOSIS — M53.3 SACROILIAC JOINT DYSFUNCTION OF RIGHT SIDE: ICD-10-CM

## 2021-07-21 DIAGNOSIS — M70.61 GREATER TROCHANTERIC BURSITIS OF RIGHT HIP: ICD-10-CM

## 2021-07-21 DIAGNOSIS — M48.061 LUMBAR FORAMINAL STENOSIS: ICD-10-CM

## 2021-07-21 DIAGNOSIS — M51.26 HERNIATED LUMBAR INTERVERTEBRAL DISC: ICD-10-CM

## 2021-07-21 DIAGNOSIS — M17.11 OSTEOARTHRITIS OF RIGHT KNEE, UNSPECIFIED OSTEOARTHRITIS TYPE: ICD-10-CM

## 2021-07-21 DIAGNOSIS — E66.8 MODERATE OBESITY: ICD-10-CM

## 2021-07-21 PROCEDURE — 99213 OFFICE O/P EST LOW 20 MIN: CPT | Performed by: NURSE PRACTITIONER

## 2021-07-21 RX ORDER — NABUMETONE 750 MG/1
750 TABLET, FILM COATED ORAL 2 TIMES DAILY
Qty: 180 TABLET | Refills: 0 | Status: SHIPPED | OUTPATIENT
Start: 2021-07-21 | End: 2021-09-09 | Stop reason: SDUPTHER

## 2021-07-21 RX ORDER — METHOCARBAMOL 750 MG/1
750 TABLET, FILM COATED ORAL 4 TIMES DAILY PRN
COMMUNITY
End: 2021-07-21 | Stop reason: SDUPTHER

## 2021-07-21 RX ORDER — FEXOFENADINE HCL 180 MG/1
180 TABLET ORAL DAILY
COMMUNITY

## 2021-07-21 RX ORDER — METHOCARBAMOL 750 MG/1
750 TABLET, FILM COATED ORAL 2 TIMES DAILY PRN
Qty: 180 TABLET | Refills: 1 | Status: SHIPPED | OUTPATIENT
Start: 2021-07-21 | End: 2021-09-09 | Stop reason: SDUPTHER

## 2021-07-23 ENCOUNTER — TELEPHONE (OUTPATIENT)
Dept: ENDOCRINOLOGY | Facility: CLINIC | Age: 51
End: 2021-07-23

## 2021-07-23 RX ORDER — LEVOTHYROXINE SODIUM 0.05 MG/1
50 TABLET ORAL DAILY
Qty: 90 TABLET | Status: CANCELLED
Start: 2021-07-23

## 2021-07-23 RX ORDER — LEVOTHYROXINE SODIUM 50 MCG
50 TABLET ORAL DAILY
Qty: 90 TABLET | Refills: 3 | Status: SHIPPED | OUTPATIENT
Start: 2021-07-23 | End: 2021-08-02 | Stop reason: SDUPTHER

## 2021-07-23 NOTE — TELEPHONE ENCOUNTER
"PT CALLED REQUESTING WE SEND IN AN RX FOR SYNTHROID TO [facility]. SHE REQUESTED IT BE FOR DOSE OF 50 AND HAVE \"DISPENSE AS WRITTEN #5\" ON THE RX. PLEASE AND THANK YOU  "

## 2021-07-26 RX ORDER — LEVOTHYROXINE SODIUM 25 MCG
50 TABLET ORAL DAILY
Qty: 180 TABLET | Refills: 0 | Status: SHIPPED | OUTPATIENT
Start: 2021-07-26 | End: 2021-09-09 | Stop reason: SDUPTHER

## 2021-08-02 RX ORDER — LEVOTHYROXINE SODIUM 0.05 MG/1
50 TABLET ORAL DAILY
Qty: 90 TABLET | Refills: 3 | Status: SHIPPED | OUTPATIENT
Start: 2021-08-02 | End: 2022-03-10 | Stop reason: SDUPTHER

## 2021-08-02 NOTE — TELEPHONE ENCOUNTER
"PT CALLED STATING SHE'S HAD TROUBLE GETTING HER SYNTHROID RX FILLED CORRECTLY. SHE REQUESTED WE CALL IN SYNTHROID 50 TO [facility] AND WRITE ON RX \"substitution allowed\". PLEASE AND THANK YOU  "

## 2021-09-09 ENCOUNTER — OFFICE VISIT (OUTPATIENT)
Dept: ENDOCRINOLOGY | Facility: CLINIC | Age: 51
End: 2021-09-09

## 2021-09-09 ENCOUNTER — LAB (OUTPATIENT)
Dept: LAB | Facility: HOSPITAL | Age: 51
End: 2021-09-09

## 2021-09-09 VITALS
SYSTOLIC BLOOD PRESSURE: 108 MMHG | DIASTOLIC BLOOD PRESSURE: 74 MMHG | BODY MASS INDEX: 34.8 KG/M2 | OXYGEN SATURATION: 99 % | HEIGHT: 69 IN | WEIGHT: 235 LBS | HEART RATE: 62 BPM

## 2021-09-09 DIAGNOSIS — E03.9 HYPOTHYROIDISM (ACQUIRED): ICD-10-CM

## 2021-09-09 DIAGNOSIS — E04.1 NON-TOXIC UNINODULAR GOITER: ICD-10-CM

## 2021-09-09 DIAGNOSIS — E03.9 HYPOTHYROIDISM (ACQUIRED): Primary | ICD-10-CM

## 2021-09-09 PROCEDURE — 84443 ASSAY THYROID STIM HORMONE: CPT

## 2021-09-09 PROCEDURE — 99213 OFFICE O/P EST LOW 20 MIN: CPT | Performed by: INTERNAL MEDICINE

## 2021-09-09 RX ORDER — PHENTERMINE HYDROCHLORIDE 37.5 MG/1
TABLET ORAL
COMMUNITY
Start: 2021-09-08 | End: 2023-01-19

## 2021-09-09 RX ORDER — TOPIRAMATE 25 MG/1
TABLET ORAL DAILY
COMMUNITY
Start: 2021-09-08 | End: 2023-01-19

## 2021-09-09 RX ORDER — CONJUGATED ESTROGENS 0.62 MG/G
CREAM VAGINAL
COMMUNITY
Start: 2021-08-10

## 2021-09-09 NOTE — PROGRESS NOTES
"     Office Note      Date: 2021  Patient Name: Dusty Zepeda  MRN: [id number]  : 1970    Chief Complaint   Patient presents with   • Hypothyroidism       History of Present Illness:   Dusty Zepeda is a 50 y.o. female who presents for Hypothyroidism    She remains on T4 50mcg qd. She reports taking this correctly and regularly. She denies sxs of hypo- or hyperthyroidism, aside from some fatigue. She denies any changes in her neck. She denies compressive sxs.     MNG - partial thyroidectomy ; FNA  - atypical but ThyraMIR highly likely benign    She is planning a trip to [facility] next week.  She had the COVID19 booster.    Subjective      Review of Systems:   Review of Systems   Constitutional: Negative.    Cardiovascular: Negative.    Gastrointestinal: Negative.    Endocrine: Negative.        The following portions of the patient's history were reviewed and updated as appropriate: allergies, current medications, past family history, past medical history, past social history, past surgical history and problem list.    Objective     Visit Vitals  /74   Pulse 62   Ht 175.3 cm (69\")   Wt 107 kg (235 lb)   SpO2 99%   BMI 34.70 kg/m²       Physical Exam:  Physical Exam  Constitutional:       Appearance: Normal appearance.   Neck:      Thyroid: No thyroid mass, thyromegaly or thyroid tenderness.   Lymphadenopathy:      Cervical: No cervical adenopathy.   Neurological:      Mental Status: She is alert.         Labs:    TSH  No results found for: TSHBASE     Free T4  No results found for: FREET4    T3  No results found for: [id number]      TPO  No results found for: THYROIDAB    TG AB  No results found for: THGAB    TG  No results found for: THYROGLB    CBC w/DIFF  Lab Results   Component Value Date    WBC 4.14 2021    RBC 4.51 2021    HGB 14.3 2021    HCT 42.0 2021    MCV 93.1 2021    MCH 31.7 2021    MCHC 34.0 2021    RDW 11.8 (L) 2021    RDWSD 40.0 " 02/24/2021    MPV 9.8 02/24/2021     02/24/2021           Assessment / Plan      Assessment & Plan:  Diagnoses and all orders for this visit:    1. Hypothyroidism (acquired) (Primary)  Assessment & Plan:  Continue T4.  Check TSH today.    Orders:  -     TSH; Future    2. Non-toxic uninodular goiter  Assessment & Plan:  Plan for neck u/s in about a year.        Return in about 6 months (around 3/9/2022) for Recheck with TSH.    Don Torres MD   09/09/2021

## 2021-09-10 LAB — TSH SERPL DL<=0.05 MIU/L-ACNC: 2.03 UIU/ML (ref 0.27–4.2)

## 2021-09-16 ENCOUNTER — TRANSCRIBE ORDERS (OUTPATIENT)
Dept: ADMINISTRATIVE | Facility: HOSPITAL | Age: 51
End: 2021-09-16

## 2021-09-16 DIAGNOSIS — Z12.31 VISIT FOR SCREENING MAMMOGRAM: Primary | ICD-10-CM

## 2021-10-15 ENCOUNTER — HOSPITAL ENCOUNTER (OUTPATIENT)
Dept: MAMMOGRAPHY | Facility: HOSPITAL | Age: 51
Discharge: HOME | End: 2021-10-15

## 2021-10-15 DIAGNOSIS — Z12.31 VISIT FOR SCREENING MAMMOGRAM: ICD-10-CM

## 2021-11-19 ENCOUNTER — HOSPITAL ENCOUNTER (OUTPATIENT)
Dept: MAMMOGRAPHY | Facility: HOSPITAL | Age: 51
Discharge: HOME | End: 2021-11-19
Admitting: OBSTETRICS & GYNECOLOGY

## 2021-11-19 PROCEDURE — 77063 BREAST TOMOSYNTHESIS BI: CPT

## 2021-11-19 PROCEDURE — 77067 SCR MAMMO BI INCL CAD: CPT

## 2021-11-19 PROCEDURE — 77063 BREAST TOMOSYNTHESIS BI: CPT | Performed by: RADIOLOGY

## 2021-11-19 PROCEDURE — 77067 SCR MAMMO BI INCL CAD: CPT | Performed by: RADIOLOGY

## 2021-12-03 ENCOUNTER — LAB (OUTPATIENT)
Dept: LAB | Facility: HOSPITAL | Age: 51
End: 2021-12-03

## 2021-12-03 ENCOUNTER — TRANSCRIBE ORDERS (OUTPATIENT)
Dept: LAB | Facility: HOSPITAL | Age: 51
End: 2021-12-03

## 2021-12-03 DIAGNOSIS — E78.00 PURE HYPERCHOLESTEROLEMIA: ICD-10-CM

## 2021-12-03 DIAGNOSIS — E78.00 PURE HYPERCHOLESTEROLEMIA: Primary | ICD-10-CM

## 2021-12-03 LAB
CHOLEST SERPL-MCNC: 189 MG/DL (ref 0–200)
HDLC SERPL-MCNC: 39 MG/DL (ref 40–60)
LDLC SERPL CALC-MCNC: 138 MG/DL (ref 0–100)
LDLC/HDLC SERPL: 3.5 {RATIO}
TRIGL SERPL-MCNC: 67 MG/DL (ref 0–150)
VLDLC SERPL-MCNC: 12 MG/DL (ref 5–40)

## 2021-12-03 PROCEDURE — 80061 LIPID PANEL: CPT

## 2021-12-03 PROCEDURE — 36415 COLL VENOUS BLD VENIPUNCTURE: CPT

## 2022-03-10 ENCOUNTER — LAB (OUTPATIENT)
Dept: LAB | Facility: HOSPITAL | Age: 52
End: 2022-03-10

## 2022-03-10 ENCOUNTER — OFFICE VISIT (OUTPATIENT)
Dept: ENDOCRINOLOGY | Facility: CLINIC | Age: 52
End: 2022-03-10

## 2022-03-10 VITALS
BODY MASS INDEX: 31.84 KG/M2 | WEIGHT: 215 LBS | HEIGHT: 69 IN | HEART RATE: 86 BPM | OXYGEN SATURATION: 100 % | DIASTOLIC BLOOD PRESSURE: 72 MMHG | SYSTOLIC BLOOD PRESSURE: 100 MMHG

## 2022-03-10 DIAGNOSIS — E03.9 HYPOTHYROIDISM (ACQUIRED): Primary | ICD-10-CM

## 2022-03-10 DIAGNOSIS — E03.9 HYPOTHYROIDISM (ACQUIRED): ICD-10-CM

## 2022-03-10 DIAGNOSIS — E04.1 NON-TOXIC UNINODULAR GOITER: ICD-10-CM

## 2022-03-10 PROCEDURE — 84443 ASSAY THYROID STIM HORMONE: CPT

## 2022-03-10 PROCEDURE — 99213 OFFICE O/P EST LOW 20 MIN: CPT | Performed by: INTERNAL MEDICINE

## 2022-03-10 RX ORDER — KETOCONAZOLE 20 MG/ML
SHAMPOO TOPICAL
COMMUNITY
Start: 2022-03-03 | End: 2023-01-19

## 2022-03-10 RX ORDER — LEVOTHYROXINE SODIUM 0.05 MG/1
50 TABLET ORAL DAILY
Qty: 90 TABLET | Refills: 3 | Status: SHIPPED | OUTPATIENT
Start: 2022-03-10 | End: 2022-03-10 | Stop reason: SDUPTHER

## 2022-03-10 RX ORDER — LEVOTHYROXINE SODIUM 0.05 MG/1
50 TABLET ORAL DAILY
Qty: 90 TABLET | Refills: 3 | Status: SHIPPED | OUTPATIENT
Start: 2022-03-10 | End: 2023-03-16 | Stop reason: SDUPTHER

## 2022-03-10 NOTE — PROGRESS NOTES
"     Office Note      Date: 03/10/2022  Patient Name: Dusty Zepeda  MRN: [id number]  : 1970    Chief Complaint   Patient presents with   • Hypothyroidism       History of Present Illness:   Dusty Zepeda is a 51 y.o. female who presents for Hypothyroidism    She remains on T4 50mcg qd. She reports taking this correctly and regularly. She denies sxs of hypo- or hyperthyroidism at this time. She denies any changes in her neck. She denies compressive sxs.     MNG - partial thyroidectomy ; FNA  - atypical but ThyraMIR highly likely benign    Subjective      Review of Systems:   Review of Systems   Constitutional: Negative.    Cardiovascular: Negative.    Gastrointestinal: Negative.    Endocrine: Negative.        The following portions of the patient's history were reviewed and updated as appropriate: allergies, current medications, past family history, past medical history, past social history, past surgical history and problem list.    Objective     Visit Vitals  /72   Pulse 86   Ht 175.3 cm (69\")   Wt 97.5 kg (215 lb)   SpO2 100%   BMI 31.75 kg/m²       Physical Exam:  Physical Exam  Constitutional:       Appearance: Normal appearance.   Neck:      Thyroid: No thyroid mass, thyromegaly or thyroid tenderness.   Lymphadenopathy:      Cervical: No cervical adenopathy.   Neurological:      Mental Status: She is alert.         Labs:    TSH  No results found for: TSHBASE     Free T4  No results found for: FREET4    T3  No results found for: N1RTADG      TPO  No results found for: THYROIDAB    TG AB  No results found for: THGAB    TG  No results found for: THYROGLB    CBC w/DIFF  Lab Results   Component Value Date    WBC 4.14 2021    RBC 4.51 2021    HGB 14.3 2021    HCT 42.0 2021    MCV 93.1 2021    MCH 31.7 2021    MCHC 34.0 2021    RDW 11.8 (L) 2021    RDWSD 40.0 2021    MPV 9.8 2021     2021           Assessment " / Plan      Assessment & Plan:  Diagnoses and all orders for this visit:    1. Hypothyroidism (acquired) (Primary)  Assessment & Plan:  Continue T4 tx.  Check TSH today.    Orders:  -     TSH; Future    2. Non-toxic uninodular goiter  Assessment & Plan:  Plan for neck u/s in 6 months.      Other orders  -     Discontinue: levothyroxine (Synthroid) 50 MCG tablet; Take 1 tablet by mouth Daily.  Dispense: 90 tablet; Refill: 3  -     levothyroxine (Synthroid) 50 MCG tablet; Take 1 tablet by mouth Daily.  Dispense: 90 tablet; Refill: 3      Return in about 6 months (around 9/10/2022) for Recheck with TSH, neck u/s.    Don Torres MD   03/10/2022

## 2022-03-11 LAB — TSH SERPL DL<=0.05 MIU/L-ACNC: 2.48 UIU/ML (ref 0.27–4.2)

## 2022-07-03 PROCEDURE — U0004 COV-19 TEST NON-CDC HGH THRU: HCPCS | Performed by: EMERGENCY MEDICINE

## 2022-09-15 ENCOUNTER — LAB (OUTPATIENT)
Dept: LAB | Facility: HOSPITAL | Age: 52
End: 2022-09-15

## 2022-09-15 ENCOUNTER — OFFICE VISIT (OUTPATIENT)
Dept: ENDOCRINOLOGY | Facility: CLINIC | Age: 52
End: 2022-09-15

## 2022-09-15 VITALS
BODY MASS INDEX: 30.21 KG/M2 | DIASTOLIC BLOOD PRESSURE: 74 MMHG | OXYGEN SATURATION: 97 % | WEIGHT: 204 LBS | HEART RATE: 54 BPM | SYSTOLIC BLOOD PRESSURE: 110 MMHG | HEIGHT: 69 IN

## 2022-09-15 DIAGNOSIS — E03.9 HYPOTHYROIDISM (ACQUIRED): ICD-10-CM

## 2022-09-15 DIAGNOSIS — E04.1 NON-TOXIC UNINODULAR GOITER: ICD-10-CM

## 2022-09-15 DIAGNOSIS — E03.9 HYPOTHYROIDISM (ACQUIRED): Primary | ICD-10-CM

## 2022-09-15 PROCEDURE — 76536 US EXAM OF HEAD AND NECK: CPT | Performed by: INTERNAL MEDICINE

## 2022-09-15 PROCEDURE — 84443 ASSAY THYROID STIM HORMONE: CPT

## 2022-09-15 PROCEDURE — 99213 OFFICE O/P EST LOW 20 MIN: CPT | Performed by: INTERNAL MEDICINE

## 2022-09-15 RX ORDER — VENLAFAXINE HYDROCHLORIDE 75 MG/1
CAPSULE, EXTENDED RELEASE ORAL
COMMUNITY
Start: 2022-09-12

## 2022-09-15 NOTE — PROGRESS NOTES
"     Office Note      Date: 09/15/2022  Patient Name: Dusty Zepeda  MRN: [id number]  : 1970    Chief Complaint   Patient presents with   • Hypothyroidism       History of Present Illness:   Dusty Zepeda is a 51 y.o. female who presents for Hypothyroidism    She remains on T4 50mcg qd. She reports taking this correctly and regularly. She denies sxs of hypo- or hyperthyroidism at this time. She denies any changes in her neck. She denies compressive sxs.     MNG - partial thyroidectomy ; FNA  - atypical but ThyraMIR highly likely benign    Subjective      Review of Systems:   Review of Systems   Constitutional: Negative.    Cardiovascular: Negative.    Gastrointestinal: Negative.    Endocrine: Negative.        The following portions of the patient's history were reviewed and updated as appropriate: allergies, current medications, past family history, past medical history, past social history, past surgical history and problem list.    Objective     Visit Vitals  /74   Pulse 54   Ht 175.3 cm (69\")   Wt 92.5 kg (204 lb)   SpO2 97%   BMI 30.13 kg/m²       Physical Exam:  Physical Exam  Constitutional:       Appearance: Normal appearance.   Neurological:      Mental Status: She is alert.         Labs:    TSH  No results found for: TSHBASE     Free T4  No results found for: FREET4    T3  No results found for: [id number]      TPO  No results found for: THYROIDAB    TG AB  No results found for: THGAB    TG  No results found for: THYROGLB    CBC w/DIFF  Lab Results   Component Value Date    WBC 4.14 2021    RBC 4.51 2021    HGB 14.3 2021    HCT 42.0 2021    MCV 93.1 2021    MCH 31.7 2021    MCHC 34.0 2021    RDW 11.8 (L) 2021    RDWSD 40.0 2021    MPV 9.8 2021     2021           Assessment / Plan      Assessment & Plan:  Diagnoses and all orders for this visit:    1. Hypothyroidism (acquired) (Primary)  Assessment & " Plan:  Continue T4.  Check TSH today.  Will send note about results.    Orders:  -     TSH; Future    2. Non-toxic uninodular goiter  Assessment & Plan:  A neck u/s was performed today.  This revealed absent left thyroid lobe.  The right lobe contained a solid mixed density nodule that was 3.5cm in size.  No abnormal lymph nodes were seen.  This appears stable compared to u/s from 9/2020.    Plan for another u/s in 2 years.    Orders:  -     US Thyroid      Return in about 6 months (around 3/15/2023) for Recheck with TSH.    Don Torres MD   09/15/2022

## 2022-09-15 NOTE — ASSESSMENT & PLAN NOTE
A neck u/s was performed today.  This revealed absent left thyroid lobe.  The right lobe contained a solid mixed density nodule that was 3.5cm in size.  No abnormal lymph nodes were seen.  This appears stable compared to u/s from 9/2020.    Plan for another u/s in 2 years.

## 2022-09-16 LAB — TSH SERPL DL<=0.05 MIU/L-ACNC: 2.21 UIU/ML (ref 0.27–4.2)

## 2022-10-24 ENCOUNTER — TRANSCRIBE ORDERS (OUTPATIENT)
Dept: ADMINISTRATIVE | Facility: HOSPITAL | Age: 52
End: 2022-10-24

## 2022-10-24 DIAGNOSIS — Z12.31 VISIT FOR SCREENING MAMMOGRAM: Primary | ICD-10-CM

## 2022-12-01 ENCOUNTER — HOSPITAL ENCOUNTER (OUTPATIENT)
Dept: MAMMOGRAPHY | Facility: HOSPITAL | Age: 52
Discharge: HOME | End: 2022-12-01
Admitting: OBSTETRICS & GYNECOLOGY

## 2022-12-01 DIAGNOSIS — Z12.31 VISIT FOR SCREENING MAMMOGRAM: ICD-10-CM

## 2022-12-01 PROCEDURE — 77067 SCR MAMMO BI INCL CAD: CPT

## 2022-12-01 PROCEDURE — 77063 BREAST TOMOSYNTHESIS BI: CPT

## 2022-12-01 PROCEDURE — 77067 SCR MAMMO BI INCL CAD: CPT | Performed by: RADIOLOGY

## 2022-12-01 PROCEDURE — 77063 BREAST TOMOSYNTHESIS BI: CPT | Performed by: RADIOLOGY

## 2023-01-19 ENCOUNTER — OFFICE VISIT (OUTPATIENT)
Dept: ORTHOPEDIC SURGERY | Facility: CLINIC | Age: 53
End: 2023-01-19
Payer: COMMERCIAL

## 2023-01-19 DIAGNOSIS — M70.62 TROCHANTERIC BURSITIS OF LEFT HIP: ICD-10-CM

## 2023-01-19 DIAGNOSIS — M17.12 PRIMARY OSTEOARTHRITIS OF LEFT KNEE: ICD-10-CM

## 2023-01-19 DIAGNOSIS — M25.562 LEFT KNEE PAIN, UNSPECIFIED CHRONICITY: Primary | ICD-10-CM

## 2023-01-19 PROCEDURE — 99214 OFFICE O/P EST MOD 30 MIN: CPT | Performed by: ORTHOPAEDIC SURGERY

## 2023-01-19 RX ORDER — ERGOCALCIFEROL 1.25 MG/1
CAPSULE ORAL
COMMUNITY
Start: 2022-10-25 | End: 2023-02-16

## 2023-01-19 RX ORDER — FLUTICASONE PROPIONATE 50 MCG
SPRAY, SUSPENSION (ML) NASAL
COMMUNITY
Start: 2022-12-15

## 2023-01-19 NOTE — PROGRESS NOTES
[facility] [place] [facility] Note        Subjective     Pain of the Left Knee      HPI    Dusty Zepeda is a 52 y.o. female who presents with new problem of: left knee pain.  Onset: atraumatic and gradual in nature. The issue has been ongoing for 4 month(s). Pain is a 3/10 on the pain scale. Pain is described as aching. Associated symptoms include pain, popping, grinding and stiffness. The pain is worse with walking, sitting and climbing stairs; resting and pain medication and/or NSAID improve the pain. Previous treatments have included: NSAIDS and oral steroids.    I have reviewed the following portions of the patient's history:History of Present Illness and review of systems.      Patient is here today for new problem day regarding her left lower extremity.  She tells me that she was seeing someone at  for her right thumb CMC joint and mention that she had had leg pain.  Was sent to Dr. Gill who told her she likely had a stress fracture in her tibia.  Patient tells me she has aching pain in the central part of her tibia and lateral sided knee pain.  She also has lateral sided hip pain.  Has been to physical therapy for her IT band which did not help her.  Patient has difficulty being active.  She is here for further evaluation treatment.  On her contralateral side, she was diagnosed with arthritis of the right knee and SI joint dysfunction.  Was seen 2 years ago.      Past Medical History:   Diagnosis Date   • Arthritis    • Arthritis of back 4 years   • Cervical disc disorder 4years   • Hyperthyroidism    • Hypothyroidism    • Knee swelling 6 years   • Low back pain    • Non-toxic uninodular goiter    • Stress fracture 4 months   • Tennis elbow 10 years      Past Surgical History:   Procedure Laterality Date   • HYSTERECTOMY     • THYROID LOBECTOMY     • THYROIDECTOMY, PARTIAL        Family History   Problem Relation Age of Onset   • Thyroid disease Mother    • Hypothyroidism Mother    • No Known  Problems Father    • Thyroid disease Sister    • Breast cancer Neg Hx    • Ovarian cancer Neg Hx      Social History     Socioeconomic History   • Marital status:    Tobacco Use   • Smoking status: Never   • Smokeless tobacco: Never   Vaping Use   • Vaping Use: Never used   Substance and Sexual Activity   • Alcohol use: Yes     Comment: Have a drink occasionally   • Drug use: Never   • Sexual activity: Yes     Partners: Male     Birth control/protection: Post-menopausal, Hysterectomy      Current Outpatient Medications on File Prior to Visit   Medication Sig Dispense Refill   • busPIRone (BUSPAR) 5 MG tablet Take 5 mg by mouth 3 (Three) Times a Day.     • estradiol (ESTRACE) 1 MG tablet estradiol 1 mg tablet     • fexofenadine (ALLEGRA) 180 MG tablet Take 180 mg by mouth Daily.     • fluticasone (FLONASE) 50 MCG/ACT nasal spray      • levothyroxine (Synthroid) 50 MCG tablet Take 1 tablet by mouth Daily. 90 tablet 3   • Premarin 0.625 MG/GM vaginal cream Twice a week     • triamterene-hydrochlorothiazide (MAXZIDE-25) 37.5-25 MG per tablet Daily.     • venlafaxine XR (EFFEXOR-XR) 75 MG 24 hr capsule      • vitamin D (ERGOCALCIFEROL) 1.25 MG (95236 UT) capsule capsule      • [DISCONTINUED] ketoconazole (NIZORAL) 2 % shampoo As directed     • [DISCONTINUED] phentermine (ADIPEX-P) 37.5 MG tablet 1/2 tablet daily     • [DISCONTINUED] predniSONE (DELTASONE) 10 MG (21) dose pack Take  by mouth Daily. Use as directed on package 21 tablet 0   • [DISCONTINUED] topiramate (TOPAMAX) 25 MG tablet Daily.       No current facility-administered medications on file prior to visit.      No Known Allergies       Review of Systems     I reviewed the patient's chief complaint, history of present illness, review of systems, past medical history, surgical history, family history, social history, medications and allergy list.        Objective      Physical Exam  There were no vitals taken for this visit.    There is no height or  weight on file to calculate BMI.    General  Mental Status - alert  General Appearance - cooperative, well groomed, not in acute distress  Orientation - Oriented X3  Build & Nutrition - well developed and well nourished  Posture - normal posture  Gait - normal gait       Ortho Exam  Peripheral Vascular  Lower Extremity   Edema - None bilaterally    Musculoskeletal  Lower Extremity   Left Hip    Normal range of motion    No crepitus, instability, subluxation or laxity    No known fractures or dislocations   Right Hip    Normal range of motion    No crepitus, instability, subluxation or laxity    No known fractures or dislocations     Inspection and palpation    Tenderness -      Left - over greater trochanter     Swelling - none bilaterally    Tissue tension/texture is pliable and soft bilaterally     Normal warmth bilaterally   Strength and Tone    Left hip flexors - 5/5     Right hip flexors - 5/5   Deformities/Postures/Misalignments/Discrepancies    No leg length discrepancy   Functional Testing    Stinchfield test positive bilaterally    90-90 straight leg raise negative bilaterally    Mary's test:  positive          Musculoskeletal:  Global Assessment:  Overall assessment of Lower Extremity Muscle Strength and Tone:  Left quadriceps--5/5   Left hamstrings--5/5       Left tibialis anterior--5/5  Left gastroc-soleus--5/5  Left EHL --5/5    Lower Extremity:    Inspection and Palpation:  Left knee:  Tenderness:  Over the lateral joint line and moderate severity  Effusion:  1+  Crepitus:  Positive  Pulses:  2+  Ecchymosis:  None  Warmth:  None     ROM:  Left:  Extension: 5     Flexion:120    Instability:    Left:    Lachman Test:  Negative   Varus stress test negative  Valgus stress test negative    Deformities/Malalignments/Discrepancies:    Left:  Genu Varum     Functional Testing:  Michelle's test:  Negative  Patella grind test:  Positive  Q-angle:  normal        Imaging/Studies  Imaging Results (Last 24 Hours)      Procedure Component Value Units Date/Time    XR Knee 4+ View Left [[id number]] Resulted: 01/19/23 0914     Updated: 01/19/23 0915    Narrative:      Knee X-Ray    Indication: Pain    Study:  Upright AP, Skiers, Lateral, and Sunrise views of Left knee(s)    Comparison: None    Findings:    Patient appears to have mild degenerative changes in the medial   compartment.    There are minimal degenerative changes in the lateral compartment.    There are mild early moderate changes in the patellofemoral compartment.    Patient has overall varus alignment.    Kellgren-Campbell ndGndrndanddndend:nd nd2nd Impression:   Mild medial compartment and mild early moderate patellofemoral compartment   degnerative changes of the knee               Assessment    Assessment:  1. Left knee pain, unspecified chronicity    2. Primary osteoarthritis of left knee    3. Trochanteric bursitis of left hip        Plan:  1. Continue over-the-counter medication as needed for discomfort  2. Left hip pain with trochanteric bursitis--plan will be for an MRI of her left hip.  She has failed physical therapy.  3. Left knee pain with patellofemoral arthritis--patient has mild patella momo radiographically.  She has lateral sided knee pain.  I want to get an MRI of her knee to evaluate her lateral sided knee pain further.  This could be IT band syndrome but also could represent an articular cartilage issue or lateral meniscal tear as well.  See her back to review those studies being cognizant of the fact that she has a big trip in February that we need to get her ready for her.        Nadir Moe MD  01/19/23  09:32 EST      Dictated Utilizing Dragon Dictation.

## 2023-02-08 ENCOUNTER — TELEPHONE (OUTPATIENT)
Dept: ORTHOPEDIC SURGERY | Facility: CLINIC | Age: 53
End: 2023-02-08

## 2023-02-08 NOTE — TELEPHONE ENCOUNTER
Caller: Dusty Orellana OR DR. ORELLANA    Relationship: Self    Best call back number: [phone]    What is the medical concern/diagnosis:     M70.62 (ICD-10-CM) - Trochanteric bursitis of left hip    M17.12 (ICD-10-CM) - Primary osteoarthritis of left knee    M25.562 (ICD-10-CM) - Left knee pain, unspecified chronicity    What specialty or service is being requested: MRI IMAGING    What is the provider, practice or medical service name:     [facility]       What is the office location: [address] [place] [address] [address], [place] [address]      What is the office phone number: [phone]    Any additional details: SPOKE TO SLIM WITH [facility]. THEY WOULD BE ABLE TO GET PATIENT IN AS SOON AS  02/11/23. NEEDING ORDER AND AUTHORIZATION FROM [facility]. PATIENT IS WANTING TO GET MRI DONE ASAP. SHE WILL BE GOING OUT THE COUNTY AT THE END OF FEB AND WILL BE GETTING HAND SURGERY WHEN SHE COMES BACK. [facility] CANNOT GET PATIENT IN UNTIL April. PLEASE ADVISE.

## 2023-02-13 DIAGNOSIS — M17.12 PRIMARY OSTEOARTHRITIS OF LEFT KNEE: ICD-10-CM

## 2023-02-13 DIAGNOSIS — M25.562 LEFT KNEE PAIN, UNSPECIFIED CHRONICITY: ICD-10-CM

## 2023-02-15 DIAGNOSIS — M70.62 TROCHANTERIC BURSITIS OF LEFT HIP: ICD-10-CM

## 2023-02-16 ENCOUNTER — OFFICE VISIT (OUTPATIENT)
Dept: ORTHOPEDIC SURGERY | Facility: CLINIC | Age: 53
End: 2023-02-16
Payer: COMMERCIAL

## 2023-02-16 VITALS
BODY MASS INDEX: 31.28 KG/M2 | DIASTOLIC BLOOD PRESSURE: 64 MMHG | SYSTOLIC BLOOD PRESSURE: 108 MMHG | WEIGHT: 211.2 LBS | HEIGHT: 69 IN

## 2023-02-16 DIAGNOSIS — M76.899 HAMSTRING TENDINITIS AT ORIGIN: ICD-10-CM

## 2023-02-16 DIAGNOSIS — M17.12 PRIMARY OSTEOARTHRITIS OF LEFT KNEE: ICD-10-CM

## 2023-02-16 DIAGNOSIS — M70.62 TROCHANTERIC BURSITIS OF LEFT HIP: Primary | ICD-10-CM

## 2023-02-16 DIAGNOSIS — M22.42 CHONDROMALACIA OF LEFT PATELLA: ICD-10-CM

## 2023-02-16 DIAGNOSIS — S73.192D TEAR OF LEFT ACETABULAR LABRUM, SUBSEQUENT ENCOUNTER: ICD-10-CM

## 2023-02-16 DIAGNOSIS — M25.562 LEFT KNEE PAIN, UNSPECIFIED CHRONICITY: ICD-10-CM

## 2023-02-16 PROCEDURE — 20610 DRAIN/INJ JOINT/BURSA W/O US: CPT | Performed by: ORTHOPAEDIC SURGERY

## 2023-02-16 PROCEDURE — 99214 OFFICE O/P EST MOD 30 MIN: CPT | Performed by: ORTHOPAEDIC SURGERY

## 2023-02-16 RX ORDER — TRIAMCINOLONE ACETONIDE 40 MG/ML
40 INJECTION, SUSPENSION INTRA-ARTICULAR; INTRAMUSCULAR
Status: COMPLETED | OUTPATIENT
Start: 2023-02-16 | End: 2023-02-16

## 2023-02-16 RX ORDER — LIDOCAINE HYDROCHLORIDE 10 MG/ML
3 INJECTION, SOLUTION EPIDURAL; INFILTRATION; INTRACAUDAL; PERINEURAL
Status: COMPLETED | OUTPATIENT
Start: 2023-02-16 | End: 2023-02-16

## 2023-02-16 RX ADMIN — TRIAMCINOLONE ACETONIDE 40 MG: 40 INJECTION, SUSPENSION INTRA-ARTICULAR; INTRAMUSCULAR at 09:12

## 2023-02-16 RX ADMIN — LIDOCAINE HYDROCHLORIDE 3 ML: 10 INJECTION, SOLUTION EPIDURAL; INFILTRATION; INTRACAUDAL; PERINEURAL at 09:12

## 2023-02-16 NOTE — PROGRESS NOTES
"    [facility] [place] [facility] [place]        Subjective     CC: Follow-up (MRI follow up Left Knee and Left Hip 02.13.2023/02.14.2023)       HPI    Dusty Zepeda is a 52 y.o. female.  Patient here today for follow-up of the MRI of her left knee and left hip.  Continues to complain more of left lateral sided hip pain than anything else.  Daughter had surgery on her patella as a teenager.    Overall, patient's symptoms are as above.  Patient is leaving the country next week for 2[place]2 weeks.    ROS:    Constiutional:Pt denies fever, chills, nausea, or vomiting.  MSK:as above        Objective      Past Medical History  Past Medical History:   Diagnosis Date   • Arthritis    • Arthritis of back 4 years   • Cervical disc disorder 4years   • Hyperthyroidism    • Hypothyroidism    • Knee swelling 6 years   • Low back pain    • Non-toxic uninodular goiter    • Stress fracture 4 months   • Tennis elbow 10 years     Social History     Socioeconomic History   • Marital status:    Tobacco Use   • Smoking status: Never   • Smokeless tobacco: Never   Vaping Use   • Vaping Use: Never used   Substance and Sexual Activity   • Alcohol use: Yes     Comment: Have a drink occasionally   • Drug use: Never   • Sexual activity: Yes     Partners: Male     Birth control/protection: Post-menopausal, Hysterectomy          Physical Exam  /64   Ht 175.3 cm (69.02\")   Wt 95.8 kg (211 lb 3.2 oz)   BMI 31.17 kg/m²     Body mass index is 31.17 kg/m².    Patient is well nourished and well developed.        Ortho Exam  Peripheral Vascular  Lower Extremity   Edema - None bilaterally    Musculoskeletal  Lower Extremity   Left Hip    Normal range of motion    No crepitus, instability, subluxation or laxity    No known fractures or dislocations   Right Hip    Normal range of motion    No crepitus, instability, subluxation or laxity    No known fractures or dislocations     Inspection and palpation    Tenderness -      Left - over " greater trochanter     Swelling - none bilaterally    Tissue tension/texture is pliable and soft bilaterally     Normal warmth bilaterally   Strength and Tone    Left hip flexors - 5/5     Right hip flexors - 5/5   Deformities/Postures/Misalignments/Discrepancies    No leg length discrepancy   Functional Testing    Stinchfield test positive bilaterally    90-90 straight leg raise negative bilaterally    Mary's test:  positive            Imaging/Labs/EMG Reviewed:  Imaging Results (Last 24 Hours)     ** No results found for the last 24 hours. **        We have reviewed images and reports of the MRI of the patient's left knee and left hip.  Left knee from 2/13/2023.  Left hip MRI from 2/14/2023.  MRI of the left knee shows a flattened trochlea and patella chondromalacia.  In terms of the left hip MRI, there are labral tear superiorly and anteriorly.  There are some insertional hamstring tendinitis.  There are changes laterally at the gluteus medius insertion consistent with trochanteric bursitis and partial tears of the myotendinous junction.    Assessment    Assessment:  1. Trochanteric bursitis of left hip    2. Left knee pain, unspecified chronicity    3. Primary osteoarthritis of left knee    4. Hamstring tendinitis at origin    5. Tear of left acetabular labrum, subsequent encounter    6. Chondromalacia of left patella        Plan:  1. Recommend over the counter anti-inflammatories for pain and/or swelling  2. Chondromalacia patella with patellofemoral maltracking--observe for now.  3. Left hip pain and labral tear and hamstring insertional tendinitis and partial tearing with trochanteric bursitis--plan will be for diagnostic and therapeutic injection of corticosteroid into the left trochanteric bursa.  I will see her back in 3 months and we will assess efficacy of the injection.    Procedure Note:    I discussed with the patient the potential benefits of performing a therapeutic injections as well as potential  risks including but not limited to infection, swelling, pain, bleeding, bruising, nerve/vessel damage, skin color changes, transient elevation in blood glucose levels, and fat atrophy. After informed consent and after the areas were prepped with chlorhexadine soap, ethyl chloride was used to numb the skin. Via the lateral approach over the area of maximal tenderness, 5mL of 1% lidocaine followed by 40mg of Kenalog were each injected into the trochanteric bursa of the left hip. The patient tolerated the procedure well. There were no complications. A sterile dressing was placed over the injection sites.        Nadir Moe MD  02/16/23  09:14 EST      Dictated Utilizing Dragon Dictation.

## 2023-02-16 NOTE — PROGRESS NOTES
Procedure   - Large Joint Arthrocentesis: L greater trochanteric bursa on 2/16/2023 9:12 AM  Indications: pain  Details: 22 G needle, lateral approach  Medications: 3 mL lidocaine PF 1% 1 %; 40 mg triamcinolone acetonide 40 MG/ML  Outcome: tolerated well, no immediate complications  Procedure, treatment alternatives, risks and benefits explained, specific risks discussed. Consent was given by the patient. Immediately prior to procedure a time out was called to verify the correct patient, procedure, equipment, support staff and site/side marked as required. Patient was prepped and draped in the usual sterile fashion.

## 2023-03-16 ENCOUNTER — OFFICE VISIT (OUTPATIENT)
Dept: ENDOCRINOLOGY | Facility: CLINIC | Age: 53
End: 2023-03-16
Payer: COMMERCIAL

## 2023-03-16 VITALS
SYSTOLIC BLOOD PRESSURE: 116 MMHG | HEART RATE: 72 BPM | DIASTOLIC BLOOD PRESSURE: 70 MMHG | OXYGEN SATURATION: 97 % | BODY MASS INDEX: 31.84 KG/M2 | HEIGHT: 69 IN | WEIGHT: 215 LBS

## 2023-03-16 DIAGNOSIS — E04.1 NON-TOXIC UNINODULAR GOITER: ICD-10-CM

## 2023-03-16 DIAGNOSIS — E03.9 HYPOTHYROIDISM (ACQUIRED): Primary | ICD-10-CM

## 2023-03-16 LAB — TSH SERPL DL<=0.05 MIU/L-ACNC: 3.04 UIU/ML (ref 0.27–4.2)

## 2023-03-16 PROCEDURE — 84443 ASSAY THYROID STIM HORMONE: CPT | Performed by: INTERNAL MEDICINE

## 2023-03-16 PROCEDURE — 99213 OFFICE O/P EST LOW 20 MIN: CPT | Performed by: INTERNAL MEDICINE

## 2023-03-16 RX ORDER — LEVOTHYROXINE SODIUM 0.05 MG/1
50 TABLET ORAL DAILY
Qty: 90 TABLET | Refills: 3 | Status: SHIPPED | OUTPATIENT
Start: 2023-03-16 | End: 2023-04-05 | Stop reason: SDUPTHER

## 2023-03-16 NOTE — PROGRESS NOTES
"     Office Note      Date: 2023  Patient Name: Dusty Zepeda  MRN: [id number]  : 1970    Chief Complaint   Patient presents with   • Hypothyroidism   • Goiter       History of Present Illness:   Dusty Zepeda is a 52 y.o. female who presents for Hypothyroidism and Goiter    She remains on T4 50mcg qd. She reports taking this correctly and regularly. She denies sxs of hypo- or hyperthyroidism at this time. She denies any changes in her neck. She denies compressive sxs.     MNG - partial thyroidectomy ; FNA  - atypical but ThyraMIR highly likely benign    Subjective      Review of Systems:   Review of Systems   Constitutional: Negative.    Cardiovascular: Negative.    Gastrointestinal: Negative.    Endocrine: Negative.        The following portions of the patient's history were reviewed and updated as appropriate: allergies, current medications, past family history, past medical history, past social history, past surgical history and problem list.    Objective     Visit Vitals  /70 (BP Location: Right arm, Patient Position: Sitting, Cuff Size: Adult)   Pulse 72   Ht 175.3 cm (69\")   Wt 97.5 kg (215 lb)   SpO2 97%   BMI 31.75 kg/m²       Physical Exam:  Physical Exam  Constitutional:       Appearance: Normal appearance.   Neck:      Thyroid: No thyroid mass, thyromegaly or thyroid tenderness.   Lymphadenopathy:      Cervical: No cervical adenopathy.   Neurological:      Mental Status: She is alert.         Labs:    TSH  No results found for: TSHBASE     Free T4  No results found for: FREET4    T3  No results found for: Z3FRFUA      TPO  No results found for: THYROIDAB    TG AB  No results found for: THGAB    TG  No results found for: THYROGLB    CBC w/DIFF  Lab Results   Component Value Date    WBC 4.14 2021    RBC 4.51 2021    HGB 14.3 2021    HCT 42.0 2021    MCV 93.1 2021    MCH 31.7 2021    MCHC 34.0 2021    RDW 11.8 (L) 2021 "    RDWSD 40.0 02/24/2021    MPV 9.8 02/24/2021     02/24/2021           Assessment / Plan      Assessment & Plan:  Diagnoses and all orders for this visit:    1. Hypothyroidism (acquired) (Primary)  Assessment & Plan:  Continue T4 tx.  Check TSH.  Will send note about results.    Orders:  -     TSH; Future    2. Non-toxic uninodular goiter  Assessment & Plan:  Stable on u/s last visit.  Plan for another u/s in about 18 months.      Other orders  -     levothyroxine (Synthroid) 50 MCG tablet; Take 1 tablet by mouth Daily.  Dispense: 90 tablet; Refill: 3    Current Outpatient Medications   Medication Instructions   • busPIRone (BUSPAR) 5 mg, Oral, 3 Times Daily   • estradiol (ESTRACE) 1 MG tablet estradiol 1 mg tablet   • fexofenadine (ALLEGRA) 180 mg, Oral, Daily   • fluticasone (FLONASE) 50 MCG/ACT nasal spray No dose, route, or frequency recorded.   • levothyroxine (SYNTHROID) 50 mcg, Oral, Daily   • Premarin 0.625 MG/GM vaginal cream Twice a week   • triamterene-hydrochlorothiazide (MAXZIDE-25) 37.5-25 MG per tablet Daily   • venlafaxine XR (EFFEXOR-XR) 75 MG 24 hr capsule No dose, route, or frequency recorded.      Return in about 6 months (around 9/16/2023) for Recheck with TSH.    Don Torres MD   03/16/2023

## 2023-04-05 ENCOUNTER — TELEPHONE (OUTPATIENT)
Dept: ENDOCRINOLOGY | Facility: CLINIC | Age: 53
End: 2023-04-05
Payer: COMMERCIAL

## 2023-04-05 RX ORDER — LEVOTHYROXINE SODIUM 0.05 MG/1
50 TABLET ORAL DAILY
Qty: 90 TABLET | Refills: 3 | Status: SHIPPED | OUTPATIENT
Start: 2023-04-05

## 2023-05-16 ENCOUNTER — OFFICE VISIT (OUTPATIENT)
Dept: ORTHOPEDIC SURGERY | Facility: CLINIC | Age: 53
End: 2023-05-16
Payer: COMMERCIAL

## 2023-05-16 VITALS
WEIGHT: 221 LBS | SYSTOLIC BLOOD PRESSURE: 118 MMHG | HEIGHT: 68 IN | BODY MASS INDEX: 33.49 KG/M2 | DIASTOLIC BLOOD PRESSURE: 70 MMHG

## 2023-05-16 DIAGNOSIS — M53.3 SACROILIAC JOINT DYSFUNCTION: ICD-10-CM

## 2023-05-16 DIAGNOSIS — M25.551 RIGHT HIP PAIN: Primary | ICD-10-CM

## 2023-05-16 DIAGNOSIS — M70.61 TROCHANTERIC BURSITIS OF RIGHT HIP: ICD-10-CM

## 2023-05-16 RX ORDER — LIDOCAINE HYDROCHLORIDE 10 MG/ML
3 INJECTION, SOLUTION EPIDURAL; INFILTRATION; INTRACAUDAL; PERINEURAL
Status: COMPLETED | OUTPATIENT
Start: 2023-05-16 | End: 2023-05-16

## 2023-05-16 RX ORDER — TRIAMCINOLONE ACETONIDE 40 MG/ML
40 INJECTION, SUSPENSION INTRA-ARTICULAR; INTRAMUSCULAR
Status: COMPLETED | OUTPATIENT
Start: 2023-05-16 | End: 2023-05-16

## 2023-05-16 RX ADMIN — TRIAMCINOLONE ACETONIDE 40 MG: 40 INJECTION, SUSPENSION INTRA-ARTICULAR; INTRAMUSCULAR at 09:37

## 2023-05-16 RX ADMIN — LIDOCAINE HYDROCHLORIDE 3 ML: 10 INJECTION, SOLUTION EPIDURAL; INFILTRATION; INTRACAUDAL; PERINEURAL at 09:37

## 2023-05-16 NOTE — PROGRESS NOTES
[facility] [place] [facility] [place]        Subjective     CC: Pain of the Right Hip and Follow-up (3 month follow up; Trochanteric bursitis of left hip )      HPI        Dusty Zepeda is a 52 y.o. female who presents with new problem of: right hip pain.  Onset: atraumatic and gradual in nature. The issue has been ongoing for 5 week(s). Pain is a 5/10 on the pain scale. Pain is described as dull and aching. Associated symptoms include pain. The pain is worse with walking, sitting and leisure; pain medication and/or NSAID improve the pain. Previous treatments have included: NSAIDS.    I have reviewed the following portions of the patient's history:History of Present Illness and review of systems.      Patient is here today for new problem today regarding her right hip.  This has bothered her for the last 4 to 6 weeks.  Denies any trauma.  Says that she did well with her left hip injection but her right hip hurts her in a similar location but also radiates either from or to her right SI joint.  She is trying to do hip stretches formally and on her own.    ROS:    Constiutional:Pt denies fever, chills, nausea, or vomiting.  MSK:as above        Objective      Past Medical History  Past Medical History:   Diagnosis Date   • Arthritis    • Arthritis of back 4 years   • Cervical disc disorder 4years   • Hyperthyroidism    • Hypothyroidism    • Knee swelling 6 years   • Low back pain    • Non-toxic uninodular goiter    • Stress fracture 4 months   • Tennis elbow 10 years     Social History     Socioeconomic History   • Marital status:    Tobacco Use   • Smoking status: Never   • Smokeless tobacco: Never   Vaping Use   • Vaping Use: Never used   Substance and Sexual Activity   • Alcohol use: Yes     Comment: Have a drink occasionally   • Drug use: Never   • Sexual activity: Yes     Partners: Male     Birth control/protection: Post-menopausal, Hysterectomy          Physical Exam  /70   Ht 172.7 cm  "(68\")   Wt 100 kg (221 lb)   BMI 33.60 kg/m²     Body mass index is 33.6 kg/m².    Patient is well nourished and well developed.        Ortho Exam  Peripheral Vascular  Lower Extremity   Edema - None bilaterally    Musculoskeletal  Lower Extremity   Left Hip    Normal range of motion    No crepitus, instability, subluxation or laxity    No known fractures or dislocations   Right Hip    Normal range of motion    No crepitus, instability, subluxation or laxity    No known fractures or dislocations     Inspection and palpation    Tenderness -      Right - over greater trochanter     Swelling - none bilaterally    Tissue tension/texture is pliable and soft bilaterally     Normal warmth bilaterally   Strength and Tone    Left hip flexors - 5/5     Right hip flexors - 5/5   Deformities/Postures/Misalignments/Discrepancies    No leg length discrepancy   Functional Testing    Stinchfield test positive bilaterally    90-90 straight leg raise negative bilaterally    Mary's test:  positive            Imaging/Labs/EMG Reviewed:  Imaging Results (Last 24 Hours)     Procedure Component Value Units Date/Time    XR Hip With or Without Pelvis 2 - 3 View Right [[id number]] Resulted: 05/16/23 0930     Updated: 05/16/23 0930    Narrative:      Right Hip X-Ray    Indication: Pain    Views:  AP pelvis and Frog Leg lateral    Comparison: None    Findings:  No fracture  No bony lesion  Normal soft tissues  Normal joint spaces    Impression: Negative right hip x-ray for acute bony abnormality                Assessment    Assessment:  1. Right hip pain    2. Trochanteric bursitis of right hip    3. Sacroiliac joint dysfunction        Plan:  1. Recommend over the counter anti-inflammatories for pain and/or swelling  2. SI joint dysfunction--observe for now.  If the patient does not get meaningful relief from the injection given into the trochanteric bursa today, then we will need to look into her SI joint further.  3. Right trochanteric " bursitis--injection will be given today.  Follow-up in 3 to 4 months and we will assess response to the injection.    Procedure Note:    I discussed with the patient the potential benefits of performing a therapeutic injections as well as potential risks including but not limited to infection, swelling, pain, bleeding, bruising, nerve/vessel damage, skin color changes, transient elevation in blood glucose levels, and fat atrophy. After informed consent and after the areas were prepped with chlorhexadine soap, ethyl chloride was used to numb the skin. Via the lateral approach over the area of maximal tenderness, 5mL of 1% lidocaine followed by 40mg of Kenalog were each injected into the trochanteric bursa of the right hip. The patient tolerated the procedure well. There were no complications. A sterile dressing was placed over the injection sites.        Nadir Moe MD  05/16/23  12:37 EDT      Dictated Utilizing Dragon Dictation.

## 2023-05-16 NOTE — PROGRESS NOTES
Procedure   Large Joint Arthrocentesis: R greater trochanteric bursa  Date/Time: 5/16/2023 9:37 AM  Consent given by: patient  Site marked: site marked  Timeout: Immediately prior to procedure a time out was called to verify the correct patient, procedure, equipment, support staff and site/side marked as required   Supporting Documentation  Indications: pain   Procedure Details  Location: hip - R greater trochanteric bursa  Preparation: Patient was prepped and draped in the usual sterile fashion  Needle size: 22 G  Approach: lateral  Medications administered: 40 mg triamcinolone acetonide 40 MG/ML; 3 mL lidocaine PF 1% 1 %  Patient tolerance: patient tolerated the procedure well with no immediate complications

## 2023-06-05 ENCOUNTER — TELEPHONE (OUTPATIENT)
Dept: ORTHOPEDIC SURGERY | Facility: CLINIC | Age: 53
End: 2023-06-05
Payer: COMMERCIAL

## 2023-06-05 DIAGNOSIS — M53.3 SACROILIAC JOINT DYSFUNCTION: Primary | ICD-10-CM

## 2023-06-05 NOTE — TELEPHONE ENCOUNTER
"  Caller: Dusty Zepeda \"Dr. Montano\"    Relationship: Self    Best call back number: [phone]    What is the best time to reach you: ANY     Who are you requesting to speak with (clinical staff, provider,  specific staff member): CLINICAL     Do you know the name of the person who called: YES     What was the call regarding: PATIENT WOULD LIKE TO KNOW THE STEPS NEEDED TO BE TAKEN FOR THE SI JOINT IN THE LEFT HIP     Is it okay if the provider responds through MyChart:YES     "

## 2023-06-05 NOTE — TELEPHONE ENCOUNTER
Dr. Moe,    I called Dusty, she states the injection you gave her still didn't help the SI joint pain. She would like to know what needs to be done to get the SI joint injection. Please advise thank you!  Holli Angela RT (R), ROT

## 2023-06-07 NOTE — PROGRESS NOTES
"Chief Complaint: \"Pain in my lower back and right SI joint\"      History of Present Illness:  Ms. Dusty Zepeda, 52 y.o. female, established patient, originally referred by Dr. Nadir Moe in consultation for chronic intractable lower back pain, which had began without incident. Dusty Zepeda was last seen by EDMUND Alonso on 07/21/2021 for follow-up of diagnostic and therapeutic right sacroiliac joint injection combined with right greater trochanteric bursa injection, from which she reported almost complete pain relief due to additional analgesic benefit additive to her previous procedure. During that visit, she reported that she was doing very well and had no complaints. Previously, on May 3, 2021, she had undergone diagnostic and therapeutic right L4-L5 transforaminal epidural steroid injection, from which she reported 75% ongoing pain relief except for the persistent pain in her RT SI joint and RT GTB. Patient was satisfied with the outcome of her procedures and was able to become more physically active, walking at least 2 miles daily and participating in stretching exercises under supervision of a . She did not need to return for additional care as her pain improved to low-grade pain levels particularly axial mechanical lower back pain that did not impair her ability to carry on a very active lifestyle.  She has had axial mechanical lower back pain for the past 10 years which has been more noticeable for the past year.  More recently, she reestablish care with Dr. Moe and saw him on 01/19/2023 complaining of a 6 month history of left knee pain.  She was told at  that she probably had a stress fracture.  Left knee X-rays on 01/19/2023 revealed varus alignment. Mild degenerative changes in the medial > than lateral compartment. Moderate degenerative changes in the patellofemoral compartment. Dr. Aguilar recommended an MRI of the left knee for further assessment.  She " did not have a stress fracture.  During that period, she was wearing a boot and limb pain overload and Right lower back which cause pain over the right gluteal region.  On 02/16/2023, she underwent left greater trochanteric bursa injection with Dr. Moe for treatment of left hip pain. She saw Dr. Sapp for follow-up of her procedure on 05/16/2023, when she was complaining of right greater trochanteric bursitis and right sacroiliac joint pain. Right Hip X-rays on 05/16/202 revealed no significant bony abnormalities. Dr. Sapp recommended a right GTB injection and if the patient didn't get significant relief from the injection, he recommended to follow up with me for assessment of her lower back and SI joint issues. Upon reviewing Dusty CRISTAL Zepeda's medical records, she underwent neurosurgical consultation with Dr. Domingo Sommers on 11/9/2020, and was found to be a potential surgical candidate for microendoscopic discectomy from right L4-L5 if failure to obtain pain relief with conservative and interventional pain management measures. MRI of the lumbar spine without contrast on October 5, 2020 revealed multilevel degenerative changes with disc desiccation, ligamentum flavum hypertrophy and facet hypertrophy with appearance of a possible lumbar facet joint synovial cyst at L4-L5 on the right side.  At L3-L4: Disc bulge, facet hypertrophy. Moderate central canal stenosis and bilateral neuroforaminal stenosis. At L4-L5: Disc bulge, facet hypertrophy. Moderate central canal stenosis and right neuroforaminal stenosis. Mild left neuroforaminal stenosis. At L5-S1: Disc bulge, facet hypertrophy. Central disc protrusion contribute to moderate central canal stenosis and mild bilateral neuroforaminal stenosis. As patient responded exceedingly well to her epidural, she did not return to NSA. Pain has progressed in intensity over the past 10 months. Patient has failed to obtain pain relief with conservative measures for  more than 5 years including oral analgesics, topical analgesics, ice, heat, physical therapy (within the past 6 months), physical therapist directed home exercise program HEP (ongoing), Stretch Zone (PT based stretching program), therapeutic massage, independent exercise program (ongoing, walking for fitness 2 miles  4 x week, water therapy, yoga), to name a few  Pain Description: Constant lower back pain with intermittent exacerbation, described as aching, spasm, dull, and throbbing sensation.   Radiation of Pain: None vs pain in the right gluteal region/RT SI joint  Pain intensity today: 2/10   Average pain intensity last week: 6/10  Pain intensity ranges from: 2/10 to 8/10  Aggravating factors: Pain increases with protracted sitting, bending, twisting, repetitive movements, standing, walking. Patient denies neurogenic claudication. Patient does not use a cane or walker  Alleviating factors:  Pain decreases with changing positions, moving away from the site of pain, sitting down, lying down  Associated Symptoms:   Patient denies pain, numbness, or weakness in the lower extremities.   Patient denies any new bladder or bowel problems.   Patient reports difficulties with her balance but denies recent falls.   Pain interferes with general activities and affects patient's quality of life  Pain interferes with falling asleep   Muscle spasms: Lower back  Stiffness: Lower back    Review of previous therapies and additional medical records:  Dusty Zepeda has already failed the following measures, including:   Conservative Measures: Oral analgesics, topical analgesics, ice, heat, physical therapy (within the past 6 months), physical therapist directed home exercise program HEP (ongoing), Stretch Zone (PT based stretching program), therapeutic massage, independent exercise program (ongoing, walking for fitness 2 miles  4 x week, water therapy, yoga)   Interventional Measures:   Procedures performed by  me:  05/03/2021: DxTx right sacroiliac joint injection combined with a right greater trochanteric bursa injection  03/10/2021: DxTx right L4-L5 transforaminal epidural steroid injection  Procedures performed by Dr. Puckett (about 13 years ago): right sacroiliac joint injections with relief.    Procedures performed by Dr. Aguilar:   05/16/2023: Right greater trochanteric bursa injection   02/16/2023: Left greater trochanteric bursa injection   She also underwent right knee joint injections, right SI joint injections with Dr. Aguilar.   Surgical Measures: No history of previous lumbar spine or hip surgery    Dusty Zepeda underwent orthopedic consultation with Dr. Nadir Moe on 05/16/2023 and was found not to be a surgical candidate.    Dusty Zepeda underwent neurosurgical consultation with Dr. Domingo Sommers on 11/9/2020, and was found to be a potential surgical candidate for microendoscopic discectomy from right L4-L5 if failure to obtain pain relief with conservative and interventional pain management measures.   Dusty Zepeda presents with significant comorbidities including obesity, osteoarthritis, hypothyroidism, anxiety and depression  In terms of current analgesics, Dusty Zepeda takes: Advil PRN. Patient also takes buspirone 5 mg 3 Times a Day, venlafaxine XR 75 mg/24 hr capsule  I have reviewed Mitul Report consistent with medication reconciliation.  SOAPP: Low Risk     PHQ-2 Depression Screening  Little interest or pleasure in doing things? 0-->not at all   Feeling down, depressed, or hopeless? 0-->not at all   PHQ-2 Total Score 0       Pain Self-Efficacy Questionnaire (PSEQ)  ITEM 06-08  2023        I can enjoy things despite the pain. 5        I can do most of the household chores (tidying up, washing dishes, etc), despite the pain. 2        I can socialize with my friends or family members as often as I used to do, despite the pain. 6        I can cope with my pain  in most situations. 3        I can do some form of work, despite the pain (includes housework, paid, and unpaid work). 4        I can still do many of the things I enjoy doing, such as hobbies or leisure activity despite pain. 4        I can cope with my pain without medications. 3        I can accomplish most of my goals in life despite the pain. 5        I can live in a normal lifestyle, despite the pain. 3        I can gradually become more active, despite the pain. 3        TOTAL SCORE 38        38/60: Scores reflect adequate self-efficacy beliefs    Global Pain Scale 02-09 2021 04-13 2021 07-21 2021 06-08 2023           Pain 10 6 8  12           Feelings 7 7 2  0           Clinical outcomes 4 0 2  3           Activities 3 5 5  4           GPS Total: 24 18 17  19              The [place] Back Pain Disability Scale   DATE 06-08 2023          Sleep through the night 3          Turn over in bed 2          Get out of bed 0          Make your bed 0          Put on socks (pantyhose) 0          Ride in a car 2          Sit in a chair for several hours 2          Stand up for 20-30 minutes 0          Climb one flight of stairs 0          Walk a few blocks (200-300 yards)  0          Walk several miles 0          Run one block (about 50 yards) 2          Take food out of the refrigerator 0          Reach up to high shelves 0          Move a chair 0          Pull or push heavy doors 0          Bend over to clean the bathtub 2          Throw a ball 0          Carry two bags of groceries 0          Lift and carry a heavy suitcase 0          Total score 13              Review of New Diagnostic Studies:  I have independently reviewed and interpreted the images with the patient and used the images and a tridimensional spine model to explain findings. I have also reviewed the reports.  Right Hip X-rays on 05/16/2023: No significant bony abnormalities  Left knee X-rays on 01/19/2023 revealed varus alignment. Mild  degenerative changes in the medial > than lateral compartment. Moderate degenerative changes in the patellofemoral compartment.    MRI of the left hip without contrast 2/14/2023 small tear of the superior acetabular labrum with underlying subchondral cysts.  Minimal degenerative changes of both hips.  Low-grade interstitial tears of the gluteus medius and minimus tendons tendinosis of the left proximal hamstrings tendon at the ischium     Review of Previous New Diagnostic Studies:    MRI of the lumbar spine without contrast on October 5, 2020.  Vertebral body heights and alignment are preserved.  Diffuse disc desiccation and facet hypertrophy.  Axial imaging:  L2-L3: Disc bulge, facet hypertrophy. Mild canal stenosis and moderate bilateral foraminal stenosis  L3-L4: Disc bulge, facet hypertrophy. Moderate central canal stenosis and bilateral neuroforaminal stenosis  L4-L5: Disc bulge, facet hypertrophy. Moderate central canal stenosis and right neuroforaminal stenosis. Mild left neuroforaminal stenosis  L5-S1: Disc bulge, facet hypertrophy. Central disc protrusion contribute to moderate central canal stenosis and mild bilateral neuroforaminal stenosis    The following portions of the patient's history were reviewed and updated as appropriate: problem list, past medical history, past surgery history, social history, family history, medication reconciliation, and allergies    Review of Systems   Constitutional:  Positive for activity change.   Musculoskeletal:  Positive for arthralgias and back pain.   Allergic/Immunologic: Positive for food allergies.   Psychiatric/Behavioral:  The patient is nervous/anxious.    All other systems reviewed and are negative.      Patient Active Problem List   Diagnosis    Lumbar disc herniation with radiculopathy    Lumbar foraminal stenosis    Sacroiliac joint dysfunction of right side    Greater trochanteric bursitis of right hip    Osteoarthritis of right knee    Class 1 obesity due  to excess calories with serious comorbidity and body mass index (BMI) of 34.0 to 34.9 in adult    Hypothyroidism (acquired)    Non-toxic uninodular goiter    Spondylosis of lumbar region without myelopathy or radiculopathy    Degeneration of lumbar or lumbosacral intervertebral disc    Entrapment neuropathy: Right superior cluneal neuropathy    Leg length discrepancy    Myofascial pain    Ligamentum flavum hypertrophy    Chronic pain of left knee       Past Medical History:   Diagnosis Date    Arthritis     Arthritis of back 4 years    Cervical disc disorder 4years    Entrapment neuropathy: Right superior cluneal neuropathy 6/8/2023    Hyperthyroidism     Hypothyroidism     Joint pain 1-1-2023    Knee swelling 6 years    Low back pain     Non-toxic uninodular goiter     Spondylosis of lumbar region without myelopathy or radiculopathy 6/8/2023    Stress fracture 4 months    Tennis elbow 10 years         Past Surgical History:   Procedure Laterality Date    EPIDURAL BLOCK  1997    HAND SURGERY  03/22/2023    Trapeziectomy with extension plasty    HYSTERECTOMY      THYROID LOBECTOMY      THYROIDECTOMY, PARTIAL           Family History   Problem Relation Age of Onset    Thyroid disease Mother     Hypothyroidism Mother     No Known Problems Father     Thyroid disease Sister     Breast cancer Neg Hx     Ovarian cancer Neg Hx          Social History     Socioeconomic History    Marital status:    Tobacco Use    Smoking status: Never    Smokeless tobacco: Never   Vaping Use    Vaping Use: Never used   Substance and Sexual Activity    Alcohol use: Yes     Alcohol/week: 1.0 standard drink     Types: 1 Glasses of wine per week     Comment: Have a drink occasionally    Drug use: Never    Sexual activity: Yes     Partners: Male     Birth control/protection: Post-menopausal, Hysterectomy           Current Outpatient Medications:     busPIRone (BUSPAR) 5 MG tablet, Take 1 tablet by mouth 3 (Three) Times a Day., Disp: , Rfl:  "    EPINEPHrine (EPIPEN) 0.3 MG/0.3ML solution auto-injector injection, , Disp: , Rfl:     estradiol (ESTRACE) 1 MG tablet, estradiol 1 mg tablet, Disp: , Rfl:     fluticasone (FLONASE) 50 MCG/ACT nasal spray, , Disp: , Rfl:     levothyroxine (Synthroid) 50 MCG tablet, Take 1 tablet by mouth Daily., Disp: 90 tablet, Rfl: 3    Premarin 0.625 MG/GM vaginal cream, Twice a week, Disp: , Rfl:     triamterene-hydrochlorothiazide (MAXZIDE-25) 37.5-25 MG per tablet, Daily., Disp: , Rfl:     venlafaxine XR (EFFEXOR-XR) 75 MG 24 hr capsule, , Disp: , Rfl:       No Known Allergies      /69   Pulse 74   Temp 95.9 °F (35.5 °C)   Ht 172.7 cm (68\")   Wt 104 kg (229 lb 9.6 oz)   SpO2 98%   BMI 34.91 kg/m²       Physical Exam:  Constitutional: Patient appears well-developed, well-nourished, well-hydrated, appears younger than stated age  HEENT: Head: Normocephalic and atraumatic  Eyes: Conjunctivae and lids are normal  Pupils: Equal, round, reactive to light  Peripheral vascular exam: Posterior tibialis: right 2+ and left 2+. Dorsalis pedis: right 2+ and left 2+. No edema.   Musculoskeletal   Gait and station: Gait evaluation demonstrated a normal gait. Able to walk on heels, toes, tandem walking  Apparent left leg discrepancy with the left leg shorter  Lumbar spine: Passive and active range of motion are limited secondary to pain. Extension, lateral flexion, rotation of the lumbar spine increased and reproduced pain. Lumbar facet joint loading maneuvers are positive.  Parker test, Gaenslen's test, thigh thrust test, SI compression test, Pelvic Rock test, Yeoman's test  are negative bilaterally.   Piriformis maneuvers are negative   Hip joints: The range of motion of the hip joints is full and without pain   Palpation of the bilateral psoas tendons and iliopsoas bursas, unrevealing   Palpation of the bilateral greater trochanter, unrevealing   Examination of the Iliotibial band: unrevealing   Neurological:   Patient is " alert and oriented to person, place, and time.   Speech: Normal.   Cortical function: Normal mental status.   Reflex Scores:  Right patellar: 1+  Left patellar: 1+  Right Achilles: 1+  Left Achilles: 1+  Motor strength: 5/5  Motor Tone: Normal  Involuntary movements: None.   Superficial/Primitive Reflexes: Primitive reflexes were absent.   Right David: Absent  Left David: Absent  Right ankle clonus: Absent  Left ankle clonus: Absent   Babinsky: Absent  Long tract signs: Negative. Straight leg raising test: Negative. Femoral stretch sign: Negative.   Sensory exam: Intact to light touch, intact pain and temperature sensation, intact vibration sensation and normal proprioception.   Coordination: Normal finger to nose and heel to shin. Normal balance. Romberg's sign: Negative    Skin and subcutaneous tissue: Skin is warm and intact. No rash noted. No cyanosis.   Psychiatric: Judgment and insight: Normal. Recent and remote memory: Intact. Mood and affect: Normal.     ASSESSMENT:   1. Spondylosis of lumbar region without myelopathy or radiculopathy    2. Degeneration of lumbar or lumbosacral intervertebral disc    3. Ligamentum flavum hypertrophy    4. Lumbar foraminal stenosis    5. Entrapment neuropathy: Right superior cluneal neuropathy    6. Myofascial pain    7. Class 1 obesity due to excess calories with serious comorbidity and body mass index (BMI) of 34.0 to 34.9 in adult    8. Chronic pain of left knee    9. Leg length discrepancy          PLAN/MEDICAL DECISION MAKING:  Ms. Dusty Zepeda, 52 y.o. female, established patient, originally referred by Dr. Nadir Moe in consultation for chronic intractable lower back pain, which began without incident about 10 years ago.  Patient complains of axial mechanical lower back pain that increases with repetitive activities.  She denies radicular symptoms or neurogenic claudication.  In addition, she reports right gluteal pain that resembles right  superior cluneal neuralgia and appears usually after walking 2 miles or more.  Upon physical examination, she appears to have some degree of leg length discrepancy with her left lower extremity slightly shorter. She saw Dr. Moe on 01/19/2023 complaining of a 6 month history of left knee pain. Left knee X-rays on 01/19/2023 revealed varus alignment. Mild degenerative changes in the medial > than lateral compartment. Moderate degenerative changes in the patellofemoral compartment. Dr. Aguilar recommended an MRI of the left knee for further assessment.  She did not have a stress fracture.  During that period, she was wearing a boot and was limping due to her pain elevation to about which cause significant overload of the right lower back and gluteal region consider increasing right gluteal region pain.  On 02/16/2023, she underwent left greater trochanteric bursa injection with Dr. Moe for treatment of left hip pain. She saw Dr. Sapp for follow-up of her procedure on 05/16/2023, when she was complaining of right greater trochanteric bursitis and right sacroiliac joint pain. Right Hip X-rays on 05/16/202 revealed no significant bony abnormalities. Dr. Sapp recommended a right GTB injection and if the patient didn't get significant relief from the injection, he recommended to follow up with me for assessment of her lower back and SI joint issues. Upon reviewing Dusty BEE Duane's medical records, she underwent neurosurgical consultation with Dr. Domingo Sommers on 11/9/2020, and was found to be a potential surgical candidate for microendoscopic discectomy from right L4-L5 if failure to obtain pain relief with conservative and interventional pain management measures. MRI of the lumbar spine without contrast on October 5, 2020 revealed multilevel degenerative changes with disc desiccation, ligamentum flavum hypertrophy and facet hypertrophy with appearance of a possible lumbar facet joint synovial cyst  at L4-L5 on the right side.  At L3-L4: Disc bulge, facet hypertrophy. Moderate central canal stenosis and bilateral neuroforaminal stenosis. At L4-L5: Disc bulge, facet hypertrophy. Moderate central canal stenosis and right neuroforaminal stenosis. Mild left neuroforaminal stenosis. At L5-S1: Disc bulge, facet hypertrophy. Central disc protrusion contribute to moderate central canal stenosis and mild bilateral neuroforaminal stenosis. As patient responded exceedingly well to her epidural, she did not return to NSA. Pain has progressed in intensity over the past 10 months. Patient has failed to obtain pain relief with conservative measures for more than 5 years including oral analgesics, topical analgesics, ice, heat, physical therapy (within the past 6 months), physical therapist directed home exercise program HEP (ongoing), Stretch Zone (PT based stretching program), therapeutic massage, independent exercise program (ongoing, walking for fitness 2 miles  4 x week, water therapy, yoga), to name a few.  Patient presents with a complex chronic lower back issues.  The majority of her lower back pain appears to be axial and mechanical/facetogenic.  Secondarily, she appears to have some functional degree of entrapment of the right cluneal nerve with right superior cluneal neuralgia.  On MRI, there is evidence of significant degenerative facet joint disease with appearance of a facet joint synovial cyst on the right at L4-5, the MRI is from 2020.  There is no significant stenosis of the spinal canal although she has multilevel lateral recess stenosis.  In addition, she appears to have some leg length discrepancy which may be an underlying factor contributing to her chronic pain. A comprehensive evaluation including history and physical exam along with pertinent physiologic and functional assessment was performed. Patient presents with intractable pain due to the diagnoses listed above. Patient has failed to respond to  conservative modalities, as referenced under HPI including the impact of patient's moderate-to-severe pain contributing to significant impairment in daily activities, ADLs, and a negative impact on quality of life. Patient has done exceedingly well from previous minimally invasive interventional pain management measures, as referenced under HPI and on previous notes. Supporting diagnostic studies of patient's chronic pain condition have been reviewed. I have reviewed all available patient's medical records as well as previous therapies as referenced above. I had a lengthy conversation with Ms. Dusty Zepeda regarding her chronic pain condition and potential therapeutic options including risks, benefits, alternative therapies, to name a few. We have discussed using a stepwise approach starting with the shortest or least intense level of treatment, care, or service as determined by the extent required to diagnose and or treat patient's condition. The treatments proposed are consistent with the patient's medical condition and are known to be as safe and effective by current guidelines and standard of care. There is no evidence of absolute contraindications for the proposed procedures under the current circumstances. These treatments are not considered experimental or investigational. The duration and frequency proposed are considered appropriate for the service in accordance with accepted standards of medical practice for the diagnosis and or treatment of the patient's condition and or intended to improve the patient's level of function. These services will be furnished in a setting appropriate to the patient's medical needs and condition. Therefore, I have proposed the following plan:    1. Interventional pain management measures: Patient presents with chronic unrelenting moderate to severe axial mechanical lumbar spine facetogenic pain without evidence of underlying or untreated radiculopathy and that causes  functional deficit measured on pain scales and or functional and disability scales. Pain has been present for 10 years, worse for the past 10 months. Dusty Zepeda average pain level for the past months has been rated as 6/10 ranging from 2/10 to 8/10. Patient has failed to obtain pain relief or functional improvement from conservative measures, as referenced under HPI. Pain assessment has been performed and documented at baseline, and will be reassessed after each diagnostic procedure using the same pain scale for each assessment. A disability scale has also been obtained at baseline and will be used for functional assessment.  Diagnostic interventions will be performed without sedation or with the use of light sedation (but without the use of opioids) depending on patient's specific medical requirements. For radiofrequency procedures; we may proceed without sedation or with various degrees of sedation according to patient's specific requirements. All bilateral procedures will be done in one encounter unless patient chooses to treat one side only, patient is not able to tolerate prone position to complete both sides during the same encounter. Patient will be scheduled for a first set of diagnostic bilateral lumbar medial branch blocks at L2, L3, L4; for bilateral lumbar facet joints at L3-L4, and L4-L5, to clarify the origin of chronic refractory mechanical lower back pain. If patient experiences 80% or more relief along with significant functional improvement and increase in the range of motion of the lumbar spine, then, patient will be scheduled for a second set of diagnostic bilateral lumbar medial branch blocks, to then, proceed with bilateral lumbar medial branch rhizotomies. If patient experiences 50% or more pain relief and functional improvement for at least six months, we could repeat the procedure. If more than 2 years elapse since last RFTC, then, patient will be required to undergo diagnostic  blocks prior to repeating RFTC.  I Discussed potential treatments for her other sources of pain including the possibility of diagnostic right superior cluneal nerve block by hydrodissection technique under ultrasound and fluoroscopic guidance, diagnostic selective nerve root injections, lumbar provocative discography, regenerative therapies, PNS, SCS, versus a referral to neurosurgery if she in the future develops claudication or radicular symptoms.  For her left knee pain, she is getting treatment with Dr. Danish Kramer.    2. Diagnostic studies:  A. MRI of the lumbar spine without contrast (progression of disease)  B. X-rays of the lumbar spine including flexion and extension to assess lumbar stability  C. CT leg length study    3. Pharmacological measures: Reviewed and discussed;   A. Patient takes Advil PRN. Patient also takes buspirone 5 mg 3 Times a Day, venlafaxine XR 75 mg/24 hr capsule  B. Trial with tizanidine 2 mg 1/2 to 1 tablet 2 times a day as needed muscle spasm, #60, 2 refills   C. Trial with Rheumate one tablet once daily  D. Trial with prilocaine 2%, lidocaine 10%, imipramine 3%, capsaicin 0.001%, and mannitol 20% cream, apply 1 to 2 grams of cream to the affected areas every 4 to 6 hours as needed    4. Long-term rehabilitation efforts:  A. The patient does not have a history of falls. I did complete a risk assessment for falls.   B. Patient will start a comprehensive physical therapy program at [facility] for Alter-G, core strengthening, gait and balance training, neurodynamics, ultrasound, ASTYM, E-STIM, myofascial release, cupping, dry needling, home exercise program  C. Continue low impact exercise program such as water therapy, daily walks for fitness  D. Continue stretching program at stretch zone  E. Referral to [facility] for Pilates  F. Trial with TENS unit/interferential unit  G. Contrast therapy: Apply ice-packs for 15-20 minutes, followed by heating pads for 15-20  minutes to affected area   H. Referral to [facility]. Patient's Body mass index is 34.91 kg/m². Patient counseled on the importance of weight loss to help with overall health and pain control.   I. Dusty Zepeda  reports that she has never smoked. She has never used smokeless tobacco.    5. The patient has been instructed to contact my office with any questions or difficulties. The patient understands the plan and agrees to proceed accordingly.      The patient has a documented plan of care to address chronic pain. Dusty Zepeda reports a pain score of 3/10 (2/10 to 8/10).  Given her pain assessment as noted, treatment options were discussed and the following options were decided upon as a follow-up plan to address the patient's pain: continuation of current treatment plan for pain, educational materials on pain management, home exercises and therapy, prescription for non-opiod analgesics, referral to Physical Therapy, referral to specialist for assistance in pain treatment guidance, steroid injections, use of non-medical modalities (ice, heat, stretching and/or behavior modifications), and interventional pain management measures .            Pain Management Panel           No data to display               SOFIA query complete. SOFIA reviewed by Isrrael Meeks MD.     Pain Medications               venlafaxine XR (EFFEXOR-XR) 75 MG 24 hr capsule              No orders of the defined types were placed in this encounter.     Time spent reviewing diagnostic studies, consultation reports, previous treatments, pre-chartin minutes  Time spent face-to-face with the patient:  60 minutes  Time spent ordering diagnostic studies, referrals, and writing this note: 10 minutes  Total Time: 75 minutes    Please note that portions of this note were completed with a voice recognition program.     Isrrael Meeks MD    Patient Care Team:  Pam Calero MD as PCP -  General (Internal Medicine)  David, Merlin Pickens MD as Referring Physician (Internal Medicine)  Don Torres MD as Consulting Physician (Endocrinology)  Isrrael Meeks MD as Consulting Physician (Pain Medicine)     No orders of the defined types were placed in this encounter.        Future Appointments   Date Time Provider Department Center   9/29/2023  1:45 PM Don Torres MD [facility]

## 2023-06-08 ENCOUNTER — HOSPITAL ENCOUNTER (OUTPATIENT)
Dept: GENERAL RADIOLOGY | Facility: HOSPITAL | Age: 53
Discharge: HOME | End: 2023-06-08
Admitting: ANESTHESIOLOGY
Payer: COMMERCIAL

## 2023-06-08 ENCOUNTER — OFFICE VISIT (OUTPATIENT)
Dept: PAIN MEDICINE | Facility: CLINIC | Age: 53
End: 2023-06-08
Payer: COMMERCIAL

## 2023-06-08 VITALS
BODY MASS INDEX: 34.8 KG/M2 | HEART RATE: 74 BPM | DIASTOLIC BLOOD PRESSURE: 69 MMHG | WEIGHT: 229.6 LBS | HEIGHT: 68 IN | SYSTOLIC BLOOD PRESSURE: 103 MMHG | TEMPERATURE: 95.9 F | OXYGEN SATURATION: 98 %

## 2023-06-08 DIAGNOSIS — M47.816 SPONDYLOSIS OF LUMBAR REGION WITHOUT MYELOPATHY OR RADICULOPATHY: ICD-10-CM

## 2023-06-08 DIAGNOSIS — G89.29 CHRONIC PAIN OF LEFT KNEE: ICD-10-CM

## 2023-06-08 DIAGNOSIS — G58.9 ENTRAPMENT NEUROPATHY: ICD-10-CM

## 2023-06-08 DIAGNOSIS — E66.09 CLASS 1 OBESITY DUE TO EXCESS CALORIES WITH SERIOUS COMORBIDITY AND BODY MASS INDEX (BMI) OF 34.0 TO 34.9 IN ADULT: ICD-10-CM

## 2023-06-08 DIAGNOSIS — M48.061 LUMBAR FORAMINAL STENOSIS: ICD-10-CM

## 2023-06-08 DIAGNOSIS — M25.562 CHRONIC PAIN OF LEFT KNEE: ICD-10-CM

## 2023-06-08 DIAGNOSIS — M24.28 LIGAMENTUM FLAVUM HYPERTROPHY: ICD-10-CM

## 2023-06-08 DIAGNOSIS — M79.18 MYOFASCIAL PAIN: ICD-10-CM

## 2023-06-08 DIAGNOSIS — M51.37 DEGENERATION OF LUMBAR OR LUMBOSACRAL INTERVERTEBRAL DISC: ICD-10-CM

## 2023-06-08 DIAGNOSIS — M47.816 SPONDYLOSIS OF LUMBAR REGION WITHOUT MYELOPATHY OR RADICULOPATHY: Primary | ICD-10-CM

## 2023-06-08 DIAGNOSIS — M21.70 LEG LENGTH DISCREPANCY: ICD-10-CM

## 2023-06-08 PROBLEM — M51.379 DEGENERATION OF LUMBAR OR LUMBOSACRAL INTERVERTEBRAL DISC: Status: ACTIVE | Noted: 2023-06-08

## 2023-06-08 PROBLEM — E66.811 CLASS 1 OBESITY DUE TO EXCESS CALORIES WITH SERIOUS COMORBIDITY AND BODY MASS INDEX (BMI) OF 34.0 TO 34.9 IN ADULT: Status: ACTIVE | Noted: 2021-02-09

## 2023-06-08 PROCEDURE — 72114 X-RAY EXAM L-S SPINE BENDING: CPT

## 2023-06-08 RX ORDER — EPINEPHRINE 0.3 MG/.3ML
INJECTION SUBCUTANEOUS
COMMUNITY
Start: 2023-05-23

## 2023-06-08 RX ORDER — ME-TETRAHYDROFOLATE/B12/HRB236 1-1-500 MG
1 CAPSULE ORAL DAILY
Qty: 90 CAPSULE | Refills: 1 | Status: SHIPPED | OUTPATIENT
Start: 2023-06-08 | End: 2023-06-08

## 2023-06-08 RX ORDER — TIZANIDINE 2 MG/1
TABLET ORAL
Qty: 60 TABLET | Refills: 0 | Status: SHIPPED | OUTPATIENT
Start: 2023-06-08

## 2023-06-08 RX ORDER — ME-TETRAHYDROFOLATE/B12/HRB236 1-1-500 MG
1 CAPSULE ORAL DAILY
Qty: 90 CAPSULE | Refills: 1 | Status: SHIPPED | OUTPATIENT
Start: 2023-06-08

## 2023-06-14 ENCOUNTER — OUTSIDE FACILITY SERVICE (OUTPATIENT)
Dept: PAIN MEDICINE | Facility: CLINIC | Age: 53
End: 2023-06-14
Payer: COMMERCIAL

## 2023-06-14 PROCEDURE — 64493 INJ PARAVERT F JNT L/S 1 LEV: CPT | Performed by: ANESTHESIOLOGY

## 2023-06-14 PROCEDURE — 99152 MOD SED SAME PHYS/QHP 5/>YRS: CPT | Performed by: ANESTHESIOLOGY

## 2023-06-14 PROCEDURE — 64494 INJ PARAVERT F JNT L/S 2 LEV: CPT | Performed by: ANESTHESIOLOGY

## 2023-06-15 DIAGNOSIS — M47.816 SPONDYLOSIS OF LUMBAR REGION WITHOUT MYELOPATHY OR RADICULOPATHY: Primary | ICD-10-CM

## 2023-06-16 ENCOUNTER — TELEPHONE (OUTPATIENT)
Dept: PAIN MEDICINE | Facility: CLINIC | Age: 53
End: 2023-06-16
Payer: COMMERCIAL

## 2023-06-16 NOTE — TELEPHONE ENCOUNTER
FOLLOW-UP CALL AFTER PROCEDURE  I spoke with Dusty Zepeda regarding how they're feeling after Wednesday's procedure with Dr. Meeks. Patient reports that  she is doing well.   Pt underwent a diagnostic procedure (see procedure note for details) on 6/14/2023 . Patient reported 100% pain relief at the time of after procedure exam with Dr. Meeks. In addition, patient experienced significant functional improvement (performing activities without experiencing pain in comparison with examination prior to the procedure that reproduced pain with those activities).   Pain level before procedure: 7/10   Pain level after procedure: 0/10  Patient reports that the pain relief lasted for approximately 24 hours.  Patient denies side effects or complications  Patient does not have any questions or concerns at this time    Disposition:   • I provided information regarding date and time of next procedure: 7/5/2023 with 0600 am arrival .   • Reminded that the procedure is in the [address], 3rd floor.   • I advised that patient must have a , and that the  must remain in the premises until after the procedure is completed and the patient is ready to be discharged.   • Reminded NPO status: Nothing by mouth (eat or drink) for at least 8 hours prior to the procedure. Patient can take medications with a a sip of water.     Understanding of above information verbalized.

## 2023-07-31 DIAGNOSIS — M47.816 SPONDYLOSIS OF LUMBAR REGION WITHOUT MYELOPATHY OR RADICULOPATHY: Primary | ICD-10-CM

## 2023-08-02 ENCOUNTER — OUTSIDE FACILITY SERVICE (OUTPATIENT)
Dept: PAIN MEDICINE | Facility: CLINIC | Age: 53
End: 2023-08-02
Payer: COMMERCIAL

## 2023-08-02 PROCEDURE — 64635 DESTROY LUMB/SAC FACET JNT: CPT | Performed by: ANESTHESIOLOGY

## 2023-08-02 PROCEDURE — 64636 DESTROY L/S FACET JNT ADDL: CPT | Performed by: ANESTHESIOLOGY

## 2023-08-02 PROCEDURE — 99152 MOD SED SAME PHYS/QHP 5/>YRS: CPT | Performed by: ANESTHESIOLOGY

## 2023-08-07 ENCOUNTER — TELEPHONE (OUTPATIENT)
Dept: PAIN MEDICINE | Facility: CLINIC | Age: 53
End: 2023-08-07
Payer: COMMERCIAL

## 2023-08-07 NOTE — TELEPHONE ENCOUNTER
FOLLOW-UP CALL AFTER PROCEDURE  I spoke with Dusty Zepeda regarding how they're feeling after yesterday's procedure with Dr. Meeks. Patient reports that she is doing ok.   Pt underwent a diagnostic procedure (see procedure note for details) on 8/02/2023 . Patient reported 50% pain relief at the time of after procedure exam with Dr. Meeks. In addition, patient experienced significant functional improvement (performing activities without experiencing pain in comparison with examination prior to the procedure that reproduced pain with those activities).   Pain level before procedure: 8/10   Pain level after procedure: 4/10  Patient reports that the pain relief lasted for approximately 8 hours. Today, Dusty Zepeda reports 50% ongoing pain relief pain (pain level 4/10) OR pain has returned to baseline after approximately 8 hours.  Patient denies side effects or complications  Patient does not have any questions or concerns at this time.     Disposition:   I provided information regarding date and time of next appointment with Dr. Meeks: 12/21/2023 @ 8:00am, along with a copy mailed to address.        Understanding of above information verbalized.

## 2023-08-16 ENCOUNTER — HOSPITAL ENCOUNTER (OUTPATIENT)
Dept: MRI IMAGING | Facility: HOSPITAL | Age: 53
Discharge: HOME | End: 2023-08-16
Admitting: ANESTHESIOLOGY
Payer: COMMERCIAL

## 2023-08-16 DIAGNOSIS — M47.816 SPONDYLOSIS OF LUMBAR REGION WITHOUT MYELOPATHY OR RADICULOPATHY: ICD-10-CM

## 2023-08-16 PROCEDURE — 72148 MRI LUMBAR SPINE W/O DYE: CPT

## 2023-08-18 NOTE — PROGRESS NOTES
Dusty. The MRI revealed similar changes as the previous one (degenerative disc disease, facet arthropathy) with some levels of mild to moderate canal and foraminal stenosis. No evidence of critical or severe stenosis Best regards, Dr Meeks

## 2023-09-29 ENCOUNTER — OFFICE VISIT (OUTPATIENT)
Dept: ENDOCRINOLOGY | Facility: CLINIC | Age: 53
End: 2023-09-29
Payer: COMMERCIAL

## 2023-09-29 VITALS
HEIGHT: 69 IN | OXYGEN SATURATION: 99 % | BODY MASS INDEX: 32.58 KG/M2 | WEIGHT: 220 LBS | HEART RATE: 95 BPM | DIASTOLIC BLOOD PRESSURE: 72 MMHG | SYSTOLIC BLOOD PRESSURE: 102 MMHG

## 2023-09-29 DIAGNOSIS — E04.1 NON-TOXIC UNINODULAR GOITER: ICD-10-CM

## 2023-09-29 DIAGNOSIS — E03.9 HYPOTHYROIDISM (ACQUIRED): Primary | ICD-10-CM

## 2023-09-29 PROCEDURE — 99213 OFFICE O/P EST LOW 20 MIN: CPT | Performed by: INTERNAL MEDICINE

## 2023-09-29 RX ORDER — ALBUTEROL SULFATE 90 UG/1
2 AEROSOL, METERED RESPIRATORY (INHALATION) EVERY 4 HOURS PRN
COMMUNITY

## 2023-09-29 RX ORDER — METHYLPREDNISOLONE 4 MG/1
TABLET ORAL 2 TIMES DAILY
COMMUNITY

## 2023-09-29 RX ORDER — TIRZEPATIDE 5 MG/.5ML
5 INJECTION, SOLUTION SUBCUTANEOUS WEEKLY
COMMUNITY
Start: 2023-08-07

## 2023-09-29 NOTE — PROGRESS NOTES
"     Office Note      Date: 2023  Patient Name: Dusty Zepeda  MRN: [id number]  : 1970    Chief Complaint   Patient presents with    Thyroid Problem     Hypothyroidism (acquired)         History of Present Illness:   Dusty Zepeda is a 53 y.o. female who presents for Thyroid Problem (Hypothyroidism (acquired)/)    She remains on T4 50mcg qd. She reports taking this correctly and regularly. She denies sxs of hypo- or hyperthyroidism at this time. She denies any changes in her neck. She denies compressive sxs.     MNG - partial thyroidectomy ; FNA  - atypical but ThyraMIR highly likely benign    She has bronchitis.  Took her 1st dose of medrol earlier today.    Subjective      Review of Systems:   Review of Systems   Constitutional: Negative.    Cardiovascular: Negative.    Gastrointestinal: Negative.    Endocrine: Negative.      The following portions of the patient's history were reviewed and updated as appropriate: allergies, current medications, past family history, past medical history, past social history, past surgical history, and problem list.    Objective     Visit Vitals  /72 (BP Location: Right arm, Patient Position: Sitting, Cuff Size: Adult)   Pulse 95   Ht 175.3 cm (69\")   Wt 99.8 kg (220 lb)   SpO2 99%   BMI 32.49 kg/m²       Physical Exam:  Physical Exam  Constitutional:       Appearance: Normal appearance.   Neck:      Thyroid: No thyroid mass, thyromegaly or thyroid tenderness.   Lymphadenopathy:      Cervical: No cervical adenopathy.   Neurological:      Mental Status: She is alert.       Labs:    TSH  No results found for: TSHBASE     Free T4  No results found for: FREET4    T3  No results found for: R7LGOWS      TPO  No results found for: THYROIDAB    TG AB  No results found for: THGAB    TG  No results found for: THYROGLB    CBC w/DIFF  Lab Results   Component Value Date    WBC 4.14 2021    RBC 4.51 2021    HGB 14.3 2021    HCT 42.0 " 02/24/2021    MCV 93.1 02/24/2021    MCH 31.7 02/24/2021    MCHC 34.0 02/24/2021    RDW 11.8 (L) 02/24/2021    RDWSD 40.0 02/24/2021    MPV 9.8 02/24/2021     02/24/2021           Assessment / Plan      Assessment & Plan:  Diagnoses and all orders for this visit:    1. Hypothyroidism (acquired) (Primary)  Assessment & Plan:  Continue T4 tx.  Check TSH but wait for several weeks since she is on steroids currently.  Will send note about results.    Orders:  -     Cancel: TSH; Future  -     TSH; Future    2. Non-toxic uninodular goiter  Assessment & Plan:  Stable to palpation.  Plan for another u/s in a year.        Current Outpatient Medications   Medication Instructions    albuterol sulfate  (90 Base) MCG/ACT inhaler 2 puffs, Inhalation, Every 4 Hours PRN    busPIRone (BUSPAR) 5 mg, Oral, 3 Times Daily    Dietary Management Product (Rheumate) capsule 1 capsule, Oral, Daily    EPINEPHrine (EPIPEN) 0.3 MG/0.3ML solution auto-injector injection     estradiol (ESTRACE) 1 MG tablet estradiol 1 mg tablet    fluticasone (FLONASE) 50 MCG/ACT nasal spray No dose, route, or frequency recorded.    Gel Base gel 2 g, Does not apply, 4 Times Daily, prilocaine 2%, lidocaine 10%, imipramine 3%, capsaicin 0.001%, mannitol 20% cream,    levothyroxine (SYNTHROID) 50 mcg, Oral, Daily    methylPREDNISolone (MEDROL) 4 MG dose pack Oral, 2 Times Daily, Take as directed on package instructions.    Mounjaro 5 MG/0.5ML solution pen-injector 5 mL, Injection, Weekly    Premarin 0.625 MG/GM vaginal cream Twice a week    tiZANidine (ZANAFLEX) 2 MG tablet 1/2 to 1 tablet three times per day as needed for muscle spasms    triamterene-hydrochlorothiazide (MAXZIDE-25) 37.5-25 MG per tablet Daily    venlafaxine XR (EFFEXOR-XR) 75 MG 24 hr capsule No dose, route, or frequency recorded.      Return in about 6 months (around 3/29/2024) for Recheck with TSH.    Don Torres MD   09/29/2023

## 2023-09-29 NOTE — ASSESSMENT & PLAN NOTE
Continue T4 tx.  Check TSH but wait for several weeks since she is on steroids currently.  Will send note about results.

## 2023-10-19 ENCOUNTER — LAB (OUTPATIENT)
Dept: ENDOCRINOLOGY | Facility: CLINIC | Age: 53
End: 2023-10-19
Payer: COMMERCIAL

## 2023-10-19 DIAGNOSIS — E03.9 HYPOTHYROIDISM (ACQUIRED): ICD-10-CM

## 2023-10-19 LAB — TSH SERPL DL<=0.05 MIU/L-ACNC: 2.27 UIU/ML (ref 0.27–4.2)

## 2023-10-19 PROCEDURE — 36415 COLL VENOUS BLD VENIPUNCTURE: CPT | Performed by: INTERNAL MEDICINE

## 2023-10-19 PROCEDURE — 84443 ASSAY THYROID STIM HORMONE: CPT | Performed by: INTERNAL MEDICINE

## 2023-10-27 ENCOUNTER — TRANSCRIBE ORDERS (OUTPATIENT)
Dept: ADMINISTRATIVE | Facility: HOSPITAL | Age: 53
End: 2023-10-27
Payer: COMMERCIAL

## 2023-10-27 DIAGNOSIS — Z12.31 VISIT FOR SCREENING MAMMOGRAM: Primary | ICD-10-CM

## 2023-12-12 ENCOUNTER — OFFICE VISIT (OUTPATIENT)
Dept: ORTHOPEDIC SURGERY | Facility: CLINIC | Age: 53
End: 2023-12-12
Payer: COMMERCIAL

## 2023-12-12 VITALS
BODY MASS INDEX: 29.89 KG/M2 | HEIGHT: 69 IN | SYSTOLIC BLOOD PRESSURE: 122 MMHG | WEIGHT: 201.8 LBS | DIASTOLIC BLOOD PRESSURE: 80 MMHG

## 2023-12-12 DIAGNOSIS — M17.12 PRIMARY OSTEOARTHRITIS OF LEFT KNEE: Primary | ICD-10-CM

## 2023-12-12 PROCEDURE — 99213 OFFICE O/P EST LOW 20 MIN: CPT | Performed by: ORTHOPAEDIC SURGERY

## 2023-12-12 RX ORDER — TRIAMTERENE AND HYDROCHLOROTHIAZIDE 37.5; 25 MG/1; MG/1
1 CAPSULE ORAL DAILY
COMMUNITY
Start: 2023-11-15

## 2023-12-12 RX ORDER — MELOXICAM 15 MG/1
TABLET ORAL
COMMUNITY
Start: 2023-12-08

## 2023-12-12 NOTE — PROGRESS NOTES
"    [facility] Note        Subjective     CC: Follow-up (10 month follow up -- primary osteoarthritis of left knee; trochanteric bursitis of left hip )      MODESTA Zepeda is a 53 y.o. female.  Patient is here today for lateral sided left knee pain and lateral sided left hip pain.  Has had a right knee injection and bilateral trochanteric bursa injections in the past.  Has never had left knee injection.  Patient tells me that she feels the most pain when she is relaxing at night and with her knee fully extended.    Overall, patient's symptoms are as above.    ROS:    Cons  Has been going up and down a ladder quite a bit lately.tiutional:Pt denies fever, chills, nausea, or vomiting.  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    Hyperthyroidism     Hypothyroidism     Joint pain 1-1-2023    Knee swelling 6 years    Low back pain     Non-toxic uninodular goiter     Spondylosis of lumbar region without myelopathy or radiculopathy 6/8/2023    Stress fracture 4 months    Tennis elbow 10 years     Social History     Socioeconomic History    Marital status:    Tobacco Use    Smoking status: Never    Smokeless tobacco: Never   Vaping Use    Vaping Use: Never used   Substance and Sexual Activity    Alcohol use: Yes     Alcohol/week: 1.0 standard drink of alcohol     Types: 1 Glasses of wine per week     Comment: Have a drink occasionally    Drug use: Never    Sexual activity: Yes     Partners: Male     Birth control/protection: Post-menopausal, Hysterectomy          Physical Exam  /80   Ht 175.3 cm (69\")   Wt 91.5 kg (201 lb 12.8 oz)   BMI 29.80 kg/m²     Body mass index is 29.8 kg/m².    Patient is well nourished and well developed.        Ortho Exam  Left knee: Patient has no " medial joint line tenderness.  No lateral joint line tenderness.  Negative Michelle.  Some crepitance with ballottement of the patella.  She has full knee range of motion.  No effusion.    Imaging/Labs/EMG Reviewed:  Imaging Results (Last 24 Hours)       ** No results found for the last 24 hours. **          We have gone back and looked at her plain film and MRI.  She has chondromalacia patella and a flat trochlea.    Assessment    Assessment:  1. Primary osteoarthritis of left knee        Plan:  Recommend over the counter anti-inflammatories for pain and/or swelling  Left knee arthritis--patellofemoral.  Symptoms are under control currently.  If they worsen, injection can be given.  Because of her age, she is an ideal candidate for viscosupplementation or PRP.        Nadir Moe MD  12/12/23  12:18 EST      Dictated Utilizing Dragon Dictation.

## 2023-12-21 ENCOUNTER — OFFICE VISIT (OUTPATIENT)
Dept: PAIN MEDICINE | Facility: CLINIC | Age: 53
End: 2023-12-21
Payer: COMMERCIAL

## 2023-12-21 VITALS — WEIGHT: 205.6 LBS | HEIGHT: 69 IN | BODY MASS INDEX: 30.45 KG/M2

## 2023-12-21 DIAGNOSIS — M21.70 LEG LENGTH DISCREPANCY: ICD-10-CM

## 2023-12-21 DIAGNOSIS — G89.29 CHRONIC PAIN OF LEFT KNEE: ICD-10-CM

## 2023-12-21 DIAGNOSIS — M70.62 GREATER TROCHANTERIC BURSITIS OF LEFT HIP: Primary | ICD-10-CM

## 2023-12-21 DIAGNOSIS — M70.62 GREATER TROCHANTERIC BURSITIS OF LEFT HIP: ICD-10-CM

## 2023-12-21 DIAGNOSIS — M48.061 LUMBAR FORAMINAL STENOSIS: ICD-10-CM

## 2023-12-21 DIAGNOSIS — M47.816 SPONDYLOSIS OF LUMBAR REGION WITHOUT MYELOPATHY OR RADICULOPATHY: ICD-10-CM

## 2023-12-21 DIAGNOSIS — M24.28 LIGAMENTUM FLAVUM HYPERTROPHY: ICD-10-CM

## 2023-12-21 DIAGNOSIS — M79.18 MYOFASCIAL PAIN: ICD-10-CM

## 2023-12-21 DIAGNOSIS — M51.37 DEGENERATION OF LUMBAR OR LUMBOSACRAL INTERVERTEBRAL DISC: ICD-10-CM

## 2023-12-21 DIAGNOSIS — M25.562 CHRONIC PAIN OF LEFT KNEE: ICD-10-CM

## 2023-12-21 PROCEDURE — 99213 OFFICE O/P EST LOW 20 MIN: CPT | Performed by: ANESTHESIOLOGY

## 2023-12-21 RX ORDER — TIZANIDINE 2 MG/1
TABLET ORAL
Qty: 60 TABLET | Refills: 0 | Status: SHIPPED | OUTPATIENT
Start: 2023-12-21

## 2023-12-21 RX ORDER — TIZANIDINE 2 MG/1
TABLET ORAL
Qty: 60 TABLET | Refills: 0 | Status: SHIPPED | OUTPATIENT
Start: 2023-12-21 | End: 2023-12-21 | Stop reason: SDUPTHER

## 2023-12-21 NOTE — PROGRESS NOTES
"Chief Complaint: \"My back pain is gone since my rhizotomies. Pain in my left hip.\"      History of Present Illness:  Ms. Dusty Zepeda, 53 y.o. female originally referred by Dr. Nadir Moe in consultation for chronic intractable lower back pain. Dusty Zepeda was last seen on 08/02/2023, when she underwent bilateral lumbar medial branch rhizotomies at L2, L3, L4; for bilateral lumbar facet joints at L3-L4, and L4-L5: % ongoing pain relief and functional improvement. Patient is very satisfied with the outcome and has become more physically active. She continues exercising and stretching with a , walking for fitness and Pilates. Today, she returns with a chief complaint of recurrent left hip pain secondary to bursitis refractory to conservative measures. Since her last OV, she underwent MRI of the lumbar spine w/o contrast on 08/18/2023, which revealed grade 1 anterolisthesis of L3 on L4 and L4 on L5. Multilevel spondylosis with DDD, facet hypertrophy and ligamentum flavum hypertrophy. At L3-L4: Moderate spinal canal stenosis and moderate bilateral neural foraminal stenosis. L4-L5: Mild to moderate spinal canal stenosis and bilateral neuroforaminal stenosis. L5-S1: No significant canal stenosis. Moderate bilateral neuroforaminal stenosis. Flexion and extension X-rays of the lumbar spine on 06/08/2023. There is 2 mm anterolisthesis of L3 on L4 and trace degenerative anterolisthesis of L4 on L5. No dynamic instability with flexion and extension. Patient did not complete CT leg length study. Patient failed to obtain pain relief with conservative measures for more than 5 years including oral analgesics, topical analgesics, ice, heat, physical therapy (within the past 6 months), physical therapist directed home exercise program HEP (ongoing), Stretch Zone (PT based stretching program), therapeutic massage, independent exercise program (ongoing, walking for fitness 2 miles  4 " x week, water therapy, yoga), to name a few  Pain Description: Constant left hip pain with intermittent exacerbation, described as aching, spasm, dull, and throbbing sensation.   Radiation of Pain: None   Pain intensity today: 0/10 (back) 3 (left hip)   Average pain intensity last week: 0-1/10 (back) 3/10 (hip)  Pain intensity ranges from: 0/10 to 1/10 (lower back)  Aggravating factors: Left hip pain increases with protracted sitting. Patient denies neurogenic claudication.   Alleviating factors:  Pain decreases with moving away from the site of pain  Associated Symptoms:   Patient denies pain, numbness, or weakness in the lower extremities.   Patient denies any new bladder or bowel problems.   Patient reports difficulties with her balance but denies recent falls.   Pain interferes with general activities and affects patient's quality of life  Pain interferes with falling asleep   Muscle spasms: Lower back  Stiffness: Lower back    Review of previous therapies and additional medical records:  Dusty Zepeda has already failed the following measures, including:   Conservative Measures: Oral analgesics, topical analgesics, ice, heat, physical therapy (within the past 6 months), physical therapist directed home exercise program HEP (ongoing), Stretch Zone (PT based stretching program), therapeutic massage, independent exercise program (ongoing, walking for fitness 2 miles  4 x week, water therapy, yoga)   Interventional Measures:   08/02/2023: Bilateral lumbar medial branch rhizotomies at L2, L3, L4; for bilateral lumbar facet joints at L3-L4, and L4-L5: % ongoing pain relief and functional improvement   07/05/2023: Second set of diagnostic bilateral lumbar medial branch blocks at L2, L3, L4; for bilateral lumbar facet joints at L3-L4, and L4-L5: % pain relief and functional improvement for 12-18 hours  06/14/2023: First set of diagnostic bilateral lumbar medial branch blocks at L2, L3, L4; for  bilateral lumbar facet joints at L3-L4, and L4-L5: 90% pain relief and functional improvement for 12-18 hours  05/03/2021: DxTx right sacroiliac joint injection combined with a right greater trochanteric bursa injection  03/10/2021: DxTx right L4-L5 transforaminal epidural steroid injection  Procedures performed by Dr. Puckett (about 13 years ago): right sacroiliac joint injections with relief.    Procedures performed by Dr. Aguilar:   05/16/2023: Right greater trochanteric bursa injection   02/16/2023: Left greater trochanteric bursa injection   She also underwent right knee joint injections, right SI joint injections with Dr. Aguilar.   Surgical Measures: No history of previous lumbar spine or hip surgery    Dusty Zepeda underwent orthopedic consultation with Dr. Nadir Moe on 05/16/2023 and was found not to be a surgical candidate.    Dusty Zepeda underwent neurosurgical consultation with Dr. Domingo Sommers on 11/9/2020, and was found to be a potential surgical candidate for microendoscopic discectomy from right L4-L5 if failure to obtain pain relief with conservative and interventional pain management measures.   Dusty Zepeda presents with significant comorbidities including obesity, osteoarthritis, hypothyroidism, anxiety and depression  In terms of current analgesics, Dusty Zepeda takes: Advil PRN. Patient also takes buspirone 5 mg 3 Times a Day, venlafaxine XR 75 mg/24 hr capsule  I have reviewed Mitul Report consistent with medication reconciliation.  SOAPP: Low Risk     PHQ-2 Depression Screening  Little interest or pleasure in doing things? 0-->not at all   Feeling down, depressed, or hopeless? 0-->not at all   PHQ-2 Total Score 0       Pain Self-Efficacy Questionnaire (PSEQ)  ITEM 06-08  2023 12-21 2023       I can enjoy things despite the pain. 5 6       I can do most of the household chores (tidying up, washing dishes, etc), despite the pain. 2 6       I can  socialize with my friends or family members as often as I used to do, despite the pain. 6 6       I can cope with my pain in most situations. 3 6       I can do some form of work, despite the pain (includes housework, paid, and unpaid work). 4 6       I can still do many of the things I enjoy doing, such as hobbies or leisure activity despite pain. 4 6       I can cope with my pain without medications. 3 5       I can accomplish most of my goals in life despite the pain. 5 6       I can live in a normal lifestyle, despite the pain. 3 5       I can gradually become more active, despite the pain. 3 6       TOTAL SCORE 38 58       38/60: Scores reflect adequate self-efficacy beliefs    Global Pain Scale 02-09 2021 04-13 2021 07-21 2021 06-08 2023 12-21 2023         Pain 10 6 8  12  6         Feelings 7 7 2  0  0         Clinical outcomes 4 0 2  3  2         Activities 3 5 5  4  0         GPS Total: 24 18 17  19  8            The [place] Back Pain Disability Scale   DATE 06-08 2023 12-21 2023         Sleep through the night 3 1         Turn over in bed 2 1         Get out of bed 0 0         Make your bed 0 0         Put on socks (pantyhose) 0 0         Ride in a car 2 2         Sit in a chair for several hours 2 0         Stand up for 20-30 minutes 0 1         Climb one flight of stairs 0 2         Walk a few blocks (200-300 yards)  0 0         Walk several miles 0 0         Run one block (about 50 yards) 2 0         Take food out of the refrigerator 0 0         Reach up to high shelves 0 0         Move a chair 0 0         Pull or push heavy doors 0 0         Bend over to clean the bathtub 2 0         Throw a ball 0 0         Carry two bags of groceries 0 0         Lift and carry a heavy suitcase 0 0         Total score 13 7             Review of New Diagnostic Studies:  I have independently reviewed and interpreted the images. I have also reviewed the reports.  MRI of the lumbar spine w/o contrast on 08/18/2023.  Grade 1 anterolisthesis of L3 on L4 and L4 on L5. Alignment is otherwise anatomic. The conus medullaris and cauda equina nerve roots appear satisfactory. Multilevel spondylosis. Axial imaging:   L1-L2: No significant spinal canal or neuroforaminal stenosis.  L2-L3: Disc bulge, facet arthropathy. Mild right neural foramen stenosis.  L3-L4: Circumferential disc bulge, ligamentum flavum hypertrophy, facet arthropathy. Moderate spinal canal stenosis and moderate bilateral neural foraminal stenosis  L4-L5: circumferential disc bulge, bilateral facet arthropathy. Mild to moderate spinal canal stenosis and bilateral neuroforaminal stenosis.  L5-S1: Disc bulge, bilateral facet arthropathy. No significant canal stenosis. Moderate bilateral neuroforaminal stenosis  Flexion and extension X-rays of the lumbar spine on 06/08/2023. There is 2 mm anterolisthesis of L3 on L4 and trace degenerative anterolisthesis of L4 on L5. No dynamic instability with flexion and extension.     Review of Previous New Diagnostic Studies:    Right Hip X-rays on 05/16/2023: No significant bony abnormalities  Left knee X-rays on 01/19/2023 revealed varus alignment. Mild degenerative changes in the medial > than lateral compartment. Moderate degenerative changes in the patellofemoral compartment.    MRI of the left hip without contrast 2/14/2023 small tear of the superior acetabular labrum with underlying subchondral cysts.  Minimal degenerative changes of both hips.  Low-grade interstitial tears of the gluteus medius and minimus tendons tendinosis of the left proximal hamstrings tendon at the ischium   MRI of the lumbar spine without contrast on October 5, 2020.  Vertebral body heights and alignment are preserved.  Diffuse disc desiccation and facet hypertrophy.  Axial imaging:  L2-L3: Disc bulge, facet hypertrophy. Mild canal stenosis and moderate bilateral foraminal stenosis  L3-L4: Disc bulge, facet hypertrophy. Moderate central canal stenosis and  bilateral neuroforaminal stenosis  L4-L5: Disc bulge, facet hypertrophy. Moderate central canal stenosis and right neuroforaminal stenosis. Mild left neuroforaminal stenosis  L5-S1: Disc bulge, facet hypertrophy. Central disc protrusion contribute to moderate central canal stenosis and mild bilateral neuroforaminal stenosis    The following portions of the patient's history were reviewed and updated as appropriate: problem list, past medical history, past surgery history, social history, family history, medication reconciliation, and allergies    Review of Systems   Constitutional:  Positive for activity change.   Musculoskeletal:  Positive for arthralgias and back pain.   Allergic/Immunologic: Positive for food allergies.   Psychiatric/Behavioral:  The patient is nervous/anxious.    All other systems reviewed and are negative.        Patient Active Problem List   Diagnosis    Lumbar disc herniation with radiculopathy    Lumbar foraminal stenosis    Sacroiliac joint dysfunction of right side    Greater trochanteric bursitis of right hip    Osteoarthritis of right knee    Class 1 obesity due to excess calories with serious comorbidity and body mass index (BMI) of 34.0 to 34.9 in adult    Hypothyroidism (acquired)    Non-toxic uninodular goiter    Spondylosis of lumbar region without myelopathy or radiculopathy    Degeneration of lumbar or lumbosacral intervertebral disc    Entrapment neuropathy: Right superior cluneal neuropathy    Leg length discrepancy    Myofascial pain    Ligamentum flavum hypertrophy    Chronic pain of left knee       Past Medical History:   Diagnosis Date    Arthritis     Arthritis of back 4 years    Cervical disc disorder 4years    Entrapment neuropathy: Right superior cluneal neuropathy 6/8/2023    Hyperthyroidism     Hypothyroidism     Joint pain 1-1-2023    Knee swelling 6 years    Low back pain     Non-toxic uninodular goiter     Spondylosis of lumbar region without myelopathy or  radiculopathy 6/8/2023    Stress fracture 4 months    Tennis elbow 10 years         Past Surgical History:   Procedure Laterality Date    EPIDURAL BLOCK  1997    HAND SURGERY  03/22/2023    Trapeziectomy with extension plasty    HYSTERECTOMY      THYROID LOBECTOMY      THYROIDECTOMY, PARTIAL           Family History   Problem Relation Age of Onset    Thyroid disease Mother     Hypothyroidism Mother     Cancer Mother         Kidney cancer    No Known Problems Father     Thyroid disease Sister     Breast cancer Neg Hx     Ovarian cancer Neg Hx          Social History     Socioeconomic History    Marital status:    Tobacco Use    Smoking status: Never    Smokeless tobacco: Never   Vaping Use    Vaping Use: Never used   Substance and Sexual Activity    Alcohol use: Yes     Alcohol/week: 1.0 standard drink of alcohol     Types: 1 Glasses of wine per week     Comment: Have a drink occasionally    Drug use: Never    Sexual activity: Yes     Partners: Male     Birth control/protection: Post-menopausal, Hysterectomy           Current Outpatient Medications:     busPIRone (BUSPAR) 5 MG tablet, Take 1 tablet by mouth 3 (Three) Times a Day., Disp: , Rfl:     Dietary Management Product (Rheumate) capsule, Take 1 capsule by mouth Daily., Disp: 90 capsule, Rfl: 1    EPINEPHrine (EPIPEN) 0.3 MG/0.3ML solution auto-injector injection, , Disp: , Rfl:     estradiol (ESTRACE) 1 MG tablet, estradiol 1 mg tablet, Disp: , Rfl:     fluticasone (FLONASE) 50 MCG/ACT nasal spray, , Disp: , Rfl:     levothyroxine (Synthroid) 50 MCG tablet, Take 1 tablet by mouth Daily., Disp: 90 tablet, Rfl: 3    meloxicam (MOBIC) 15 MG tablet, , Disp: , Rfl:     Mounjaro 5 MG/0.5ML solution pen-injector, Inject 5 mL as directed 1 (One) Time Per Week., Disp: , Rfl:     Premarin 0.625 MG/GM vaginal cream, Twice a week, Disp: , Rfl:     tiZANidine (ZANAFLEX) 2 MG tablet, 1/2 to 1 tablet three times per day as needed for muscle spasms, Disp: 60 tablet,  "Rfl: 0    triamterene-hydrochlorothiazide (DYAZIDE) 37.5-25 MG per capsule, Take 1 capsule by mouth Daily., Disp: , Rfl:     venlafaxine XR (EFFEXOR-XR) 75 MG 24 hr capsule, , Disp: , Rfl:       No Known Allergies      Ht 175.3 cm (69\")   Wt 93.3 kg (205 lb 9.6 oz)   BMI 30.36 kg/m²       Physical Exam:  Constitutional: Patient appears well-developed, well-nourished, well-hydrated, appears younger than stated age  HEENT: Head: Normocephalic and atraumatic  Eyes: Conjunctivae and lids are normal  Pupils: Equal, round, reactive to light  Peripheral vascular exam: Posterior tibialis: right 2+ and left 2+. Dorsalis pedis: right 2+ and left 2+. No edema.   Musculoskeletal   Gait and station: Gait evaluation demonstrated a normal gait. Able to walk on heels, toes, tandem walking  Apparent left leg discrepancy with the left leg shorter  Lumbar spine: Passive and active range of motion are almost full and without pain. Extension, lateral flexion, rotation of the lumbar spine did not increase or reproduce pain. Lumbar facet joint loading maneuvers are negative at this time.  Parker test, Gaenslen's test, thigh thrust test, SI compression test, Pelvic Rock test, Yeoman's test: Negative   Piriformis maneuvers: Negative   Hip joints: The range of motion of the hip joints is almost full and without pain   Palpation of the bilateral psoas tendons and iliopsoas bursas: Unrevealing   Palpation of the bilateral greater trochanter: Tender on the left  Examination of the Iliotibial band: Tender on the left  Neurological:   Patient is alert and oriented to person, place, and time.   Speech: Normal.   Cortical function: Normal mental status.   Reflex Scores:  Right patellar: 1+  Left patellar: 1+  Right Achilles: 1+  Left Achilles: 1+  Motor strength: 5/5  Motor Tone: Normal  Involuntary movements: None.   Superficial/Primitive Reflexes: Primitive reflexes were absent.   Right David: Absent  Left David: Absent  Right ankle " clonus: Absent  Left ankle clonus: Absent   Babinsky: Absent  Long tract signs: Negative. Straight leg raising test: Negative. Femoral stretch sign: Negative.   Sensory exam: Intact to light touch, intact pain and temperature sensation, intact vibration sensation and normal proprioception.   Coordination: Normal finger to nose and heel to shin. Normal balance. Romberg's sign: Negative    Skin and subcutaneous tissue: Skin is warm and intact. No rash noted. No cyanosis.   Psychiatric: Judgment and insight: Normal. Recent and remote memory: Intact. Mood and affect: Normal.       ASSESSMENT:   1. Greater trochanteric bursitis of left hip    2. Spondylosis of lumbar region without myelopathy or radiculopathy    3. Degeneration of lumbar or lumbosacral intervertebral disc    4. Ligamentum flavum hypertrophy    5. Lumbar foraminal stenosis    6. Myofascial pain    7. Chronic pain of left knee    8. Leg length discrepancy        PLAN/MEDICAL DECISION MAKING:  Ms. Dusty Zepeda, 53 y.o. female originally referred by Dr. Nadir Moe in consultation for chronic intractable lower back pain. Dusty Painting underwent bilateral lumbar medial branch rhizotomies at L2, L3, L4; for bilateral lumbar facet joints at L3-L4, and L4-L5 on 08/02/2023, from which she has experienced % ongoing pain relief and functional improvement. Patient is very satisfied with the outcome and has become more physically active. She continues exercising and stretching with a , walking for fitness and Pilates. Today, she returns with a chief complaint of recurrent left hip pain secondary to bursitis refractory to conservative measures. Pain has been present for several months. MRI of the lumbar spine w/o contrast on 08/18/2023, revealed grade 1 anterolisthesis of L3 on L4 and L4 on L5. Multilevel spondylosis with DDD, facet hypertrophy and ligamentum flavum hypertrophy. At L3-L4: Moderate spinal canal stenosis and  moderate bilateral neural foraminal stenosis. L4-L5: Mild to moderate spinal canal stenosis and bilateral neuroforaminal stenosis. L5-S1: No significant canal stenosis. Moderate bilateral neuroforaminal stenosis. Flexion and extension X-rays of the lumbar spine on 06/08/2023. There is 2 mm anterolisthesis of L3 on L4 and trace degenerative anterolisthesis of L4 on L5. No dynamic instability with flexion and extension. Patient did not complete CT leg length study. Patient failed to obtain pain relief with conservative measures for more than 5 years including oral analgesics, topical analgesics, ice, heat, physical therapy (within the past 6 months), physical therapist directed home exercise program HEP (ongoing), Stretch Zone (PT based stretching program), therapeutic massage, independent exercise program (ongoing, walking for fitness 2 miles  4 x week, water therapy, yoga), to name a few. A comprehensive evaluation including history and physical exam along with pertinent physiologic and functional assessment was performed. Patient presents with intractable pain due to the diagnoses listed above. Patient has failed to respond to conservative modalities, as referenced under HPI.  Patient has responded well to minimally invasive interventional pain management measures, as referenced from previous notes and under HPI. I have documented the impact of patient's moderate-to-severe pain contributing to significant impairment in daily activities, ADLs, and a negative impact on the patient's quality of life, as reflected on Global Pain Scale 8/100 (down from 19); The [place] Back Pain Disability Scale 7/100 (down from 13);  Tinetti Gait & Balance Assessment Tool  (low risk for falls). I have reviewed pertinent supporting diagnostic studies of patient's chronic pain condition as well as all available pertinent medical records to patient's chronic pain condition including previous therapies, as referenced above. PHQ-2 Depression  Screening 0; Pain Self-Efficacy Questionnaire (PSEQ) 58/60 (up from 38). I had a lengthy conversation with Dusty Zepeda regarding her chronic pain condition and potential therapeutic options including risks, benefits, alternative therapies, to name a few. We have discussed using a stepwise approach starting with the least intense level of care as determined by the extent required to diagnose and or treat a patient's condition. The proposed treatments are consistent with the patient's medical condition and known to be safe and effective by current guidelines and the standard of care. The duration and frequency proposed are considered appropriate for the service in accordance with accepted standards of medical practice for the diagnosis and treatment of the patient's condition and intended to improve the patient's level of function. These services will be furnished in a setting appropriate to the patient's medical needs and condition. Therefore, I have proposed the following plan:    1. Interventional pain management measures: Patient does not take blood thinners  A. Left greater trochanteric bursitis: Patient will be scheduled for left greater trochanteric bursa injection under ultrasound and fluoroscopic guidance. We have also discussed PRP.  B. Chronic Mechanical Axial Lower Back Pain: None indicated at this time. If patient experiences 50% or more pain relief and functional improvement for at least six months (she is at % at this time), we could repeat the procedure. If more than 2 years elapse since last RFTC, then, patient will be required to undergo one set of diagnostic bilateral lumbar medial branch blocks at L2, L3, L4; for bilateral lumbar facet joints at L3-L4, and L4-L5, prior to repeating RFTC. I have discussed potential treatments for her other sources of lower back pain including diagnostic right superior cluneal nerve block by hydrodissection technique under ultrasound and fluoroscopic  guidance, diagnostic selective nerve root injections, regenerative therapies, PNS, SCS, versus a referral to neurosurgery if she in the future develops claudication or radicular symptoms.     2. Diagnostic studies: CT leg length study    3. Pharmacological measures: Reviewed and discussed;   A. Patient takes Advil PRN. Patient also takes buspirone 5 mg 3 Times a Day, venlafaxine XR 75 mg/24 hr capsule  B. Continue Rheumate one tablet once daily  C. Continue prilocaine 2%, lidocaine 10%, imipramine 3%, capsaicin 0.001%, and mannitol 20% cream, apply 1 to 2 grams of cream to the affected areas every 4 to 6 hours as needed  D. Continue tizanidine, refilled today    4. Long-term rehabilitation efforts:  A. The patient does not have a history of falls. I did complete a risk assessment for falls.   B. Patient will start a comprehensive physical therapy program at [facility], core strengthening, gait and balance training, neurodynamics, ultrasound, ASTYM, E-STIM, myofascial release, cupping, dry needling, home exercise program  C. Continue low impact exercise program such as water therapy, daily walks for fitness  D. Continue stretching program at stretch zone  E. Referral to [facility] for Pilates  F. Trial with TENS unit/interferential unit  G. Contrast therapy: Apply ice-packs for 15-20 minutes, followed by heating pads for 15-20 minutes to affected area   H. Referral to [facility]. Patient's Body mass index is 34.91 kg/m². Patient counseled on the importance of weight loss to help with overall health and pain control.   I. Dusty Zepeda  reports that she has never smoked. She has never used smokeless tobacco.    5. The patient has been instructed to contact my office with any questions or difficulties. The patient understands the plan and agrees to proceed accordingly.      The patient has a documented plan of care to address chronic pain.  Dusty Zepeda reports a pain score of 0/10 (lower back) 4/10 (left GTB).  Given her pain assessment as noted, treatment options were discussed and the following options were decided upon as a follow-up plan to address the patient's pain: continuation of current treatment plan for pain, educational materials on pain management, home exercises and therapy, prescription for non-opiod analgesics, referral to Physical Therapy, referral to specialist for assistance in pain treatment guidance, steroid injections, use of non-medical modalities (ice, heat, stretching and/or behavior modifications), and interventional pain management measures .            Pain Management Panel           No data to display               SOFIA query complete. SOFIA reviewed by Isrrael Meeks MD.     Pain Medications               meloxicam (MOBIC) 15 MG tablet     tiZANidine (ZANAFLEX) 2 MG tablet 1/2 to 1 tablet three times per day as needed for muscle spasms    venlafaxine XR (EFFEXOR-XR) 75 MG 24 hr capsule              No orders of the defined types were placed in this encounter.       Please note that portions of this note were completed with a voice recognition program.   Any copied data in any portion of my note has been reviewed by myself and accurate.     The 21st Century Cures Act makes medical notes like this available to patients in the interest of transparency. This is a medical document intended as peer to peer communication. It is written in medical language and may contain abbreviations or verbiage that are unfamiliar. It may appear blunt or direct. Medical documents are intended to carry relevant information, facts as evident, and the clinical opinion of the practitioner.     Isrrael Meeks MD    Patient Care Team:  Pam Calero MD as PCP - General (Internal Medicine)  David, Merlin Pickens MD as Referring Physician (Internal Medicine)  Don Torres MD as Consulting Physician  (Endocrinology)  Isrrael Meeks MD as Consulting Physician (Pain Medicine)     No orders of the defined types were placed in this encounter.        Future Appointments   Date Time Provider Department Center   1/11/2024  8:20 AM [facility]   3/29/2024  1:30 PM Don Torres MD [facility]

## 2024-01-02 ENCOUNTER — TELEPHONE (OUTPATIENT)
Dept: PAIN MEDICINE | Facility: CLINIC | Age: 54
End: 2024-01-02
Payer: COMMERCIAL

## 2024-01-02 NOTE — TELEPHONE ENCOUNTER
Called patient, informed her we received her message and cancelled her appointment with Dr Meeks for 1/17/2023.

## 2024-01-02 NOTE — TELEPHONE ENCOUNTER
"Caller: Dusty Zepeda \"Dr. Montano\"    Relationship to patient: Self    Best call back number: [phone] (home)     Patient is needing: PATIENT IS FEELING BETTER AND WOULD LIKE TO CANCEL THE PROCEDURE FOR 1/17/24.    "

## 2024-01-11 ENCOUNTER — HOSPITAL ENCOUNTER (OUTPATIENT)
Dept: MAMMOGRAPHY | Facility: HOSPITAL | Age: 54
Discharge: HOME | End: 2024-01-11
Admitting: OBSTETRICS & GYNECOLOGY
Payer: COMMERCIAL

## 2024-01-11 DIAGNOSIS — Z12.31 VISIT FOR SCREENING MAMMOGRAM: ICD-10-CM

## 2024-01-11 PROCEDURE — 77063 BREAST TOMOSYNTHESIS BI: CPT

## 2024-01-11 PROCEDURE — 77067 SCR MAMMO BI INCL CAD: CPT

## 2024-02-15 DIAGNOSIS — M70.62 GREATER TROCHANTERIC BURSITIS OF LEFT HIP: Primary | ICD-10-CM

## 2024-02-15 RX ORDER — ME-TETRAHYDROFOLATE/B12/HRB236 1-1-500 MG
1 CAPSULE ORAL DAILY
Qty: 90 CAPSULE | Refills: 1 | Status: SHIPPED | OUTPATIENT
Start: 2024-02-15

## 2024-03-29 ENCOUNTER — OFFICE VISIT (OUTPATIENT)
Dept: ENDOCRINOLOGY | Facility: CLINIC | Age: 54
End: 2024-03-29
Payer: COMMERCIAL

## 2024-03-29 VITALS
WEIGHT: 192 LBS | OXYGEN SATURATION: 97 % | BODY MASS INDEX: 28.44 KG/M2 | DIASTOLIC BLOOD PRESSURE: 62 MMHG | HEART RATE: 62 BPM | HEIGHT: 69 IN | SYSTOLIC BLOOD PRESSURE: 120 MMHG

## 2024-03-29 DIAGNOSIS — E04.1 NON-TOXIC UNINODULAR GOITER: ICD-10-CM

## 2024-03-29 DIAGNOSIS — E03.9 HYPOTHYROIDISM (ACQUIRED): Primary | ICD-10-CM

## 2024-03-29 PROCEDURE — 84443 ASSAY THYROID STIM HORMONE: CPT | Performed by: INTERNAL MEDICINE

## 2024-03-29 PROCEDURE — 99213 OFFICE O/P EST LOW 20 MIN: CPT | Performed by: INTERNAL MEDICINE

## 2024-03-29 NOTE — PROGRESS NOTES
"     Office Note      Date: 2024  Patient Name: Dusty Zepeda  MRN: [id number]  : 1970    Chief Complaint   Patient presents with    Thyroid Problem     Hypothyroidism (acquired)         History of Present Illness:   Dusty Zepeda is a 53 y.o. female who presents for Thyroid Problem (Hypothyroidism (acquired)/)    She remains on T4 50mcg qd. She reports taking this correctly and regularly. She denies sxs of hypo- or hyperthyroidism at this time. She denies any changes in her neck. She denies compressive sxs.     MNG - partial thyroidectomy ; FNA  - atypical but ThyraMIR highly likely benign    Subjective      Review of Systems:   Review of Systems   Constitutional: Negative.    Cardiovascular: Negative.    Gastrointestinal: Negative.    Endocrine: Negative.        The following portions of the patient's history were reviewed and updated as appropriate: allergies, current medications, past family history, past medical history, past social history, past surgical history, and problem list.    Objective     Visit Vitals  /62 (BP Location: Right arm, Patient Position: Sitting, Cuff Size: Adult)   Pulse 62   Ht 175.3 cm (69\")   Wt 87.1 kg (192 lb)   SpO2 97%   BMI 28.35 kg/m²       Physical Exam:  Physical Exam  Constitutional:       Appearance: Normal appearance.   Neck:      Thyroid: No thyroid mass, thyromegaly or thyroid tenderness.   Lymphadenopathy:      Cervical: No cervical adenopathy.   Neurological:      Mental Status: She is alert.         Labs:    TSH  No results found for: \"TSHBASE\"     Free T4  No results found for: \"FREET4\"    T3  No results found for: \"G9GRLDB\"      TPO  No results found for: \"THYROIDAB\"    TG AB  No results found for: \"THGAB\"    TG  No results found for: \"THYROGLB\"    CBC w/DIFF  Lab Results   Component Value Date    WBC 4.14 2021    RBC 4.51 2021    HGB 14.3 2021    HCT 42.0 2021    MCV 93.1 2021    MCH 31.7 " 02/24/2021    MCHC 34.0 02/24/2021    RDW 11.8 (L) 02/24/2021    RDWSD 40.0 02/24/2021    MPV 9.8 02/24/2021     02/24/2021           Assessment / Plan      Assessment & Plan:  Diagnoses and all orders for this visit:    1. Hypothyroidism (acquired) (Primary)  Assessment & Plan:  Continue T4 tx.  Check TSH.    Orders:  -     TSH; Future    2. Non-toxic uninodular goiter  Assessment & Plan:  Plan for neck u/s next visit.        Current Outpatient Medications   Medication Instructions    busPIRone (BUSPAR) 5 mg, Oral, 3 Times Daily    EPINEPHrine (EPIPEN) 0.3 MG/0.3ML solution auto-injector injection     estradiol (ESTRACE) 1 MG tablet estradiol 1 mg tablet    levothyroxine (SYNTHROID) 50 mcg, Oral, Daily    meloxicam (MOBIC) 15 MG tablet     Mounjaro 5 MG/0.5ML solution pen-injector 5 mL, Injection, Weekly    Premarin 0.625 MG/GM vaginal cream Twice a week    triamterene-hydrochlorothiazide (DYAZIDE) 37.5-25 MG per capsule 1 capsule, Oral, Daily    venlafaxine XR (EFFEXOR-XR) 75 MG 24 hr capsule No dose, route, or frequency recorded.      Return in about 6 months (around 9/29/2024) for Recheck with TSH, neck u/s.    Electronically signed by: Don Torres MD  03/29/2024

## 2024-03-30 LAB — TSH SERPL DL<=0.05 MIU/L-ACNC: 1.75 UIU/ML (ref 0.27–4.2)

## 2024-04-23 RX ORDER — LEVOTHYROXINE SODIUM 50 MCG
50 TABLET ORAL DAILY
Qty: 90 TABLET | Refills: 3 | Status: SHIPPED | OUTPATIENT
Start: 2024-04-23

## 2024-04-23 NOTE — TELEPHONE ENCOUNTER
Rx Refill Note  Requested Prescriptions     Pending Prescriptions Disp Refills    Synthroid 50 MCG tablet [Pharmacy Med Name: SYNTHROID 50MCG TABS] 90 tablet 1     Sig: TAKE 1 TABLET DAILY      Last office visit with prescribing clinician: 3/29/2024   Last telemedicine visit with prescribing clinician: Visit date not found   Next office visit with prescribing clinician: 10/11/2024                         Would you like a call back once the refill request has been completed: [] Yes [] No    If the office needs to give you a call back, can they leave a voicemail: [] Yes [] No    Liliana Archer MA  04/23/24, 13:33 EDT

## 2024-12-19 ENCOUNTER — OFFICE VISIT (OUTPATIENT)
Dept: ENDOCRINOLOGY | Facility: CLINIC | Age: 54
End: 2024-12-19
Payer: COMMERCIAL

## 2024-12-19 VITALS
HEIGHT: 69 IN | DIASTOLIC BLOOD PRESSURE: 60 MMHG | SYSTOLIC BLOOD PRESSURE: 118 MMHG | OXYGEN SATURATION: 98 % | WEIGHT: 196 LBS | HEART RATE: 64 BPM | BODY MASS INDEX: 29.03 KG/M2

## 2024-12-19 DIAGNOSIS — E03.9 HYPOTHYROIDISM (ACQUIRED): Primary | ICD-10-CM

## 2024-12-19 DIAGNOSIS — E04.1 NON-TOXIC UNINODULAR GOITER: ICD-10-CM

## 2024-12-19 PROCEDURE — 84443 ASSAY THYROID STIM HORMONE: CPT | Performed by: INTERNAL MEDICINE

## 2024-12-19 RX ORDER — LEVOTHYROXINE SODIUM 50 UG/1
50 TABLET ORAL DAILY
Qty: 90 TABLET | Refills: 3 | Status: SHIPPED | OUTPATIENT
Start: 2024-12-19

## 2024-12-19 NOTE — ASSESSMENT & PLAN NOTE
A neck u/s was performed today.  This revealed absent left thyroid lobe.  The right lobe contained a mixed density nodule that measured 3.8cm.  This appears relatively stable compared to u/s from 9/2022.  No abnormal lymph nodes were seen.    Plan for another u/s in 2 years.

## 2024-12-19 NOTE — PROGRESS NOTES
"     Office Note      Date: 2024  Patient Name: Dusty Zepeda  MRN: [id number]  : 1970    Chief Complaint   Patient presents with    Hypothyroidism       History of Present Illness:   Dusty Zepeda is a 54 y.o. female who presents for Hypothyroidism    She remains on T4 50mcg qd. She reports taking this correctly and regularly. She denies sxs of hypo- or hyperthyroidism at this time. She denies any changes in her neck. She denies compressive sxs.     MNG - partial thyroidectomy ; FNA  - atypical but ThyraMIR highly likely benign    Subjective      Review of Systems:   Review of Systems   Constitutional: Negative.    Cardiovascular: Negative.    Gastrointestinal: Negative.    Endocrine: Negative.        The following portions of the patient's history were reviewed and updated as appropriate: allergies, current medications, past family history, past medical history, past social history, past surgical history, and problem list.    Objective     Visit Vitals  /60 (BP Location: Left arm, Patient Position: Sitting, Cuff Size: Adult)   Pulse 64   Ht 175.3 cm (69\")   Wt 88.9 kg (196 lb)   SpO2 98%   BMI 28.94 kg/m²       Physical Exam:  Physical Exam  Constitutional:       Appearance: Normal appearance.   Neurological:      Mental Status: She is alert.       Labs:    TSH  No results found for: \"TSHBASE\"     Free T4  No results found for: \"FREET4\"    T3  No results found for: \"T9POELB\"      TPO  No results found for: \"THYROIDAB\"    TG AB  No results found for: \"THGAB\"    TG  No results found for: \"THYROGLB\"    CBC w/DIFF  Lab Results   Component Value Date    WBC 4.14 2021    RBC 4.51 2021    HGB 14.3 2021    HCT 42.0 2021    MCV 93.1 2021    MCH 31.7 2021    MCHC 34.0 2021    RDW 11.8 (L) 2021    RDWSD 40.0 2021    MPV 9.8 2021     2021           Assessment / Plan      Assessment & Plan:  Diagnoses and all " orders for this visit:    1. Hypothyroidism (acquired) (Primary)  Assessment & Plan:  Continue T4 tx.  Check TSH today.    Orders:  -     TSH; Future    2. Non-toxic uninodular goiter  Assessment & Plan:  A neck u/s was performed today.  This revealed absent left thyroid lobe.  The right lobe contained a mixed density nodule that measured 3.8cm.  This appears relatively stable compared to u/s from 9/2022.  No abnormal lymph nodes were seen.    Plan for another u/s in 2 years.    Orders:  -     US Thyroid    Other orders  -     levothyroxine (Synthroid) 50 MCG tablet; Take 1 tablet by mouth Daily.  Dispense: 90 tablet; Refill: 3      Current Outpatient Medications   Medication Instructions    busPIRone (BUSPAR) 5 mg, 3 Times Daily    EPINEPHrine (EPIPEN) 0.3 MG/0.3ML solution auto-injector injection     estradiol (ESTRACE) 1 MG tablet estradiol 1 mg tablet    levothyroxine (SYNTHROID) 50 mcg, Oral, Daily    meloxicam (MOBIC) 15 MG tablet     Mounjaro 5 MG/0.5ML solution pen-injector 5 mL, Weekly    triamterene-hydrochlorothiazide (DYAZIDE) 37.5-25 MG per capsule 1 capsule, Daily    venlafaxine XR (EFFEXOR-XR) 75 MG 24 hr capsule No dose, route, or frequency recorded.      Return in about 6 months (around 6/19/2025) for Recheck with TSH.    Electronically signed by: Don Torres MD  12/19/2024

## 2024-12-20 LAB — TSH SERPL DL<=0.05 MIU/L-ACNC: 2.82 UIU/ML (ref 0.27–4.2)

## 2025-02-26 ENCOUNTER — TRANSCRIBE ORDERS (OUTPATIENT)
Dept: ADMINISTRATIVE | Facility: HOSPITAL | Age: 55
End: 2025-02-26
Payer: COMMERCIAL

## 2025-02-26 DIAGNOSIS — Z12.31 VISIT FOR SCREENING MAMMOGRAM: Primary | ICD-10-CM

## 2025-03-07 LAB
NCCN CRITERIA FLAG: NORMAL
TYRER CUZICK SCORE: 9.4

## 2025-03-12 ENCOUNTER — HOSPITAL ENCOUNTER (OUTPATIENT)
Dept: MAMMOGRAPHY | Facility: HOSPITAL | Age: 55
Discharge: HOME | End: 2025-03-12
Admitting: OBSTETRICS & GYNECOLOGY
Payer: COMMERCIAL

## 2025-03-12 DIAGNOSIS — Z12.31 VISIT FOR SCREENING MAMMOGRAM: ICD-10-CM

## 2025-03-12 PROCEDURE — 77063 BREAST TOMOSYNTHESIS BI: CPT

## 2025-03-12 PROCEDURE — 77067 SCR MAMMO BI INCL CAD: CPT

## 2025-06-02 ENCOUNTER — TELEPHONE (OUTPATIENT)
Dept: PAIN MEDICINE | Facility: CLINIC | Age: 55
End: 2025-06-02

## 2025-06-02 NOTE — TELEPHONE ENCOUNTER
Caller: AMEYA   Relationship to Patient:  SELF  Phone Number: [phone]  Reason for Call: PATIENT CALLING ASKING TO SPEAK WITH CASA ABOUT AN ABLATION

## 2025-06-04 ENCOUNTER — TELEPHONE (OUTPATIENT)
Dept: PAIN MEDICINE | Facility: CLINIC | Age: 55
End: 2025-06-04

## 2025-06-04 NOTE — TELEPHONE ENCOUNTER
"Provider: PRATIBHA    Caller: Dusty Zepeda \"Dr. Montano\"    Relationship to Patient: Self    Phone Number: [phone]*    Reason for Call: PT CALLING FOR UPDATE ON MESSAGE SHE SENT ON MONDAY REGARDING ABLATION. PT WANTING TO KNOW IF SHE NEEDS OFFICE VISIT BEFOREHAND. PLEASE ADVISE.    "

## 2025-06-06 NOTE — TELEPHONE ENCOUNTER
Patient would like to be seen on Thursday 6/12 in order to discuss having MBBs and RFTC. Ok to put her on your clinic schedule?

## 2025-06-09 NOTE — TELEPHONE ENCOUNTER
"  Caller: Dusty Zepeda \"Dr. Montano\"    Relationship: Self    Best call back number: [phone]    PER DR MCKINNON'S MESSAGE 06/06/2025 - PATIENT CALLED IN FOR STATUS, SCHEDULED WITH AGNIESZKA ROTHMAN , 06/12/2025  "

## 2025-06-12 ENCOUNTER — OFFICE VISIT (OUTPATIENT)
Dept: PAIN MEDICINE | Facility: CLINIC | Age: 55
End: 2025-06-12
Payer: COMMERCIAL

## 2025-06-12 VITALS — HEIGHT: 69 IN | BODY MASS INDEX: 29.47 KG/M2 | WEIGHT: 199 LBS

## 2025-06-12 DIAGNOSIS — M79.18 MYOFASCIAL PAIN: ICD-10-CM

## 2025-06-12 DIAGNOSIS — M70.62 GREATER TROCHANTERIC BURSITIS OF LEFT HIP: ICD-10-CM

## 2025-06-12 DIAGNOSIS — M21.70 LEG LENGTH DISCREPANCY: ICD-10-CM

## 2025-06-12 DIAGNOSIS — M24.28 LIGAMENTUM FLAVUM HYPERTROPHY: ICD-10-CM

## 2025-06-12 DIAGNOSIS — M47.816 SPONDYLOSIS OF LUMBAR REGION WITHOUT MYELOPATHY OR RADICULOPATHY: ICD-10-CM

## 2025-06-12 PROCEDURE — 99214 OFFICE O/P EST MOD 30 MIN: CPT | Performed by: NURSE PRACTITIONER

## 2025-06-12 RX ORDER — AZELASTINE 1 MG/ML
SPRAY, METERED NASAL
COMMUNITY
Start: 2025-03-17

## 2025-06-12 RX ORDER — AZITHROMYCIN 250 MG/1
TABLET, FILM COATED ORAL
COMMUNITY

## 2025-06-12 NOTE — PROGRESS NOTES
"Chief Complaint: \"My back pain is gone since my rhizotomies. Pain in my left hip.\"      History of Present Illness:  Ms. Dusty Zepeda, 54 y.o. female originally referred by Dr. Nadir Moe in consultation for chronic intractable lower back pain.  She was last evaluated with Dr. Meeks on 12/21/2023.  On 08/02/2023, she underwent bilateral lumbar medial branch rhizotomies at L2, L3, L4, for which at that time she reported an ongoing % pain relief and functional improvement.  She had become more physically active, and continued exercising and stretching with a , walking for fitness and participating Pilates.  When she was evaluated, she was complaining of recurrent left hip pain secondary to bursitis. MRI of the lumbar spine w/o contrast on 08/18/2023, which revealed grade 1 anterolisthesis of L3 on L4 and L4 on L5. Multilevel spondylosis with DDD, facet hypertrophy and ligamentum flavum hypertrophy. At L3-L4: Moderate spinal canal stenosis and moderate bilateral neural foraminal stenosis. L4-L5: Mild to moderate spinal canal stenosis and bilateral neuroforaminal stenosis. L5-S1: No significant canal stenosis. Moderate bilateral neuroforaminal stenosis. Flexion and extension X-rays of the lumbar spine on 06/08/2023. There is 2 mm anterolisthesis of L3 on L4 and trace degenerative anterolisthesis of L4 on L5. No dynamic instability with flexion and extension. Patient failed to obtain pain relief with conservative measures for including oral analgesics, topical analgesics, ice, heat, physical therapy, physical therapist directed home exercise program HEP (ongoing), Stretch Zone, therapeutic massage, independent exercise program, to name a few.  Due to her symptoms of left hip bursitis, plans were for her to undergo a left greater trochanteric bursa injection, she called the office sometime thereafter reporting improvement in her symptoms, and she canceled her procedure.  More " recently, she called the office with complaints of recurrent lower back pain.  Pain Description: Constant lower back and left hip pain with intermittent exacerbation, described as aching, spasm, dull, and throbbing sensation.   Radiation of Pain: The pain radiates into the left gluteal region, and left hip   Pain intensity today: 3/10    Average pain intensity last week: 4/10  Pain intensity ranges from: 03/10 to 6/10   Aggravating factors: Pain increases bending, extension, protracted sitting, lying down   Patient denies neurogenic claudication.   Alleviating factors:  Pain decreases with moving away from the site of pain  Associated Symptoms:   Patient denies pain, numbness, or weakness in the lower extremities.   Patient denies any new bladder or bowel problems.   Patient reports difficulties with her balance but denies recent falls.   Pain interferes with general activities and affects patient's quality of life  Pain interferes with falling asleep   Muscle spasms  Stiffness    Review of previous therapies and additional medical records:  Dusty Zepeda has already failed the following measures, including:   Conservative Measures: Oral analgesics, topical analgesics, ice, heat, physical therapy, physical therapist directed home exercise program HEP (ongoing), Stretch Zone, therapeutic massage, independent exercise program    Interventional Measures:   08/02/2023: Bilateral lumbar medial branch rhizotomies at L2, L3, L4  05/03/2021: DxTx right sacroiliac joint injection combined with a right greater trochanteric bursa injection  03/10/2021: DxTx right L4-L5 transforaminal epidural steroid injection  Procedures performed by Dr. Puckett (about 13 years ago): right sacroiliac joint injections with relief.    Procedures performed by Dr. Aguilar:   05/16/2023: Right greater trochanteric bursa injection   02/16/2023: Left greater trochanteric bursa injection   She also underwent right knee joint injections, right  SI joint injections with Dr. Aguilar.   Surgical Measures: No history of previous lumbar spine or hip surgery    Dusty Zepeda underwent orthopedic consultation with Dr. Nadir Moe on 05/16/2023 and was found not to be a surgical candidate.    Dusty Zepeda underwent neurosurgical consultation with Dr. Domingo Sommers on 11/9/2020, and was found to be a potential surgical candidate for microendoscopic discectomy from right L4-L5 if failure to obtain pain relief with conservative and interventional pain management measures.   Dusty Zepeda presents with significant comorbidities including obesity, osteoarthritis, hypothyroidism, anxiety and depression  In terms of current analgesics, Dusty Zepeda takes: Advil PRN, meloxicam. Patient also takes buspirone, venlafaxine   I have reviewed Mitul Report consistent with medication reconciliation.  SOAPP: Low Risk       Global Pain Scale 02-09 2021 04-13 2021 07-21 2021 06-08 2023 12-21 2023         Pain 10 6 8  12  6         Feelings 7 7 2  0  0         Clinical outcomes 4 0 2  3  2         Activities 3 5 5  4  0         GPS Total: 24 18 17  19  8            The [place] Back Pain Disability Scale   DATE 06-08 2023 12-21 2023         Sleep through the night 3 1         Turn over in bed 2 1         Get out of bed 0 0         Make your bed 0 0         Put on socks (pantyhose) 0 0         Ride in a car 2 2         Sit in a chair for several hours 2 0         Stand up for 20-30 minutes 0 1         Climb one flight of stairs 0 2         Walk a few blocks (200-300 yards)  0 0         Walk several miles 0 0         Run one block (about 50 yards) 2 0         Take food out of the refrigerator 0 0         Reach up to high shelves 0 0         Move a chair 0 0         Pull or push heavy doors 0 0         Bend over to clean the bathtub 2 0         Throw a ball 0 0         Carry two bags of groceries 0 0         Lift and carry a heavy suitcase 0 0          Total score 13 7             Review of Diagnostic Studies:    MRI of the lumbar spine w/o contrast on 08/18/2023. Grade 1 anterolisthesis of L3 on L4 and L4 on L5. Alignment is otherwise anatomic. The conus medullaris and cauda equina nerve roots appear satisfactory. Multilevel spondylosis. Axial imaging:   L1-L2: No significant spinal canal or neuroforaminal stenosis.  L2-L3: Disc bulge, facet arthropathy. Mild right neural foramen stenosis.  L3-L4: Circumferential disc bulge, ligamentum flavum hypertrophy, facet arthropathy. Moderate spinal canal stenosis and moderate bilateral neural foraminal stenosis  L4-L5: circumferential disc bulge, bilateral facet arthropathy. Mild to moderate spinal canal stenosis and bilateral neuroforaminal stenosis.  L5-S1: Disc bulge, bilateral facet arthropathy. No significant canal stenosis. Moderate bilateral neuroforaminal stenosis  Flexion and extension X-rays of the lumbar spine on 06/08/2023. There is 2 mm anterolisthesis of L3 on L4 and trace degenerative anterolisthesis of L4 on L5. No dynamic instability with flexion and extension.   Right Hip X-rays on 05/16/2023: No significant bony abnormalities  Left knee X-rays on 01/19/2023 revealed varus alignment. Mild degenerative changes in the medial > than lateral compartment. Moderate degenerative changes in the patellofemoral compartment.    MRI of the left hip without contrast 2/14/2023 small tear of the superior acetabular labrum with underlying subchondral cysts.  Minimal degenerative changes of both hips.  Low-grade interstitial tears of the gluteus medius and minimus tendons tendinosis of the left proximal hamstrings tendon at the ischium     The following portions of the patient's history were reviewed and updated as appropriate: problem list, past medical history, past surgery history, social history, family history, medication reconciliation, and allergies    Review of Systems   Constitutional:  Positive for  activity change.   Musculoskeletal:  Positive for arthralgias and back pain.   Allergic/Immunologic: Positive for food allergies.   Psychiatric/Behavioral:  The patient is nervous/anxious.    All other systems reviewed and are negative.        Patient Active Problem List   Diagnosis    Lumbar disc herniation with radiculopathy    Lumbar foraminal stenosis    Sacroiliac joint dysfunction of right side    Greater trochanteric bursitis of right hip    Osteoarthritis of right knee    Class 1 obesity due to excess calories with serious comorbidity and body mass index (BMI) of 34.0 to 34.9 in adult    Hypothyroidism (acquired)    Non-toxic uninodular goiter    Spondylosis of lumbar region without myelopathy or radiculopathy    Degeneration of lumbar or lumbosacral intervertebral disc    Entrapment neuropathy: Right superior cluneal neuropathy    Leg length discrepancy    Myofascial pain    Ligamentum flavum hypertrophy    Chronic pain of left knee       Past Medical History:   Diagnosis Date    Arthritis     Arthritis of back 4 years    Cervical disc disorder 4years    Degeneration of lumbar or lumbosacral intervertebral disc 06/08/2023    Entrapment neuropathy: Right superior cluneal neuropathy 06/08/2023    Hyperthyroidism     Hypothyroidism     Joint pain 1-1-2023    Knee swelling 6 years    Low back pain     Non-toxic uninodular goiter     Spondylosis of lumbar region without myelopathy or radiculopathy 06/08/2023    Stress fracture 4 months    Tennis elbow 10 years         Past Surgical History:   Procedure Laterality Date    EPIDURAL BLOCK  1997    HAND SURGERY  03/22/2023    Trapeziectomy with extension plasty    HYSTERECTOMY      THYROID LOBECTOMY      THYROIDECTOMY, PARTIAL           Family History   Problem Relation Age of Onset    Thyroid disease Mother     Hypothyroidism Mother     Cancer Mother         Kidney cancer    No Known Problems Father     Thyroid disease Sister     Breast cancer Neg Hx     Ovarian  "cancer Neg Hx          Social History     Socioeconomic History    Marital status:    Tobacco Use    Smoking status: Never    Smokeless tobacco: Never   Vaping Use    Vaping status: Never Used   Substance and Sexual Activity    Alcohol use: Yes     Alcohol/week: 1.0 standard drink of alcohol     Types: 1 Glasses of wine per week     Comment: Have a drink occasionally    Drug use: Never    Sexual activity: Yes     Partners: Male     Birth control/protection: Post-menopausal, Hysterectomy           Current Outpatient Medications:     azelastine (ASTELIN) 0.1 % nasal spray, , Disp: , Rfl:     azithromycin (ZITHROMAX) 250 MG tablet, 2 tablet Orally for first day then once daily until finished; Duration: 5 days, Disp: , Rfl:     busPIRone (BUSPAR) 5 MG tablet, Take 1 tablet by mouth 3 (Three) Times a Day., Disp: , Rfl:     EPINEPHrine (EPIPEN) 0.3 MG/0.3ML solution auto-injector injection, , Disp: , Rfl:     estradiol (ESTRACE) 1 MG tablet, estradiol 1 mg tablet, Disp: , Rfl:     levothyroxine (Synthroid) 50 MCG tablet, Take 1 tablet by mouth Daily., Disp: 90 tablet, Rfl: 3    meloxicam (MOBIC) 15 MG tablet, , Disp: , Rfl:     Mounjaro 5 MG/0.5ML solution pen-injector, Inject 5 mL as directed 1 (One) Time Per Week. (Patient taking differently: Inject 5 mL as directed Every 14 (Fourteen) Days.), Disp: , Rfl:     triamterene-hydrochlorothiazide (DYAZIDE) 37.5-25 MG per capsule, Take 1 capsule by mouth Daily., Disp: , Rfl:     venlafaxine XR (EFFEXOR-XR) 75 MG 24 hr capsule, , Disp: , Rfl:       No Known Allergies      Ht 175.3 cm (69\")   Wt 90.3 kg (199 lb)   BMI 29.39 kg/m²       Physical Exam:  Constitutional: Patient appears well-developed, well-nourished, well-hydrated, appears younger than stated age  HEENT: Head: Normocephalic and atraumatic  Eyes: Conjunctivae and lids are normal  Pupils: Equal, round, reactive to light  Peripheral vascular exam: Posterior tibialis: right 2+ and left 2+. Dorsalis pedis: " right 2+ and left 2+. No edema.   Musculoskeletal   Gait and station: Gait evaluation demonstrated a normal gait. Able to walk on heels, toes, tandem walking  Apparent left leg discrepancy with the left leg shorter  Lumbar spine: Passive and active range of motion are limited secondary to pain. Extension, lateral flexion, rotation of the lumbar spine increased and reproduced pain. Lumbar facet joint loading maneuvers are positive.  Parkre test, Gaenslen's test, thigh thrust test, SI compression test, Pelvic Rock test, Yeoman's test: Negative   Piriformis maneuvers: Negative   Hip joints: The range of motion of the hip joints is almost full and without pain   Palpation of the bilateral psoas tendons and iliopsoas bursas: Unrevealing   Palpation of the bilateral greater trochanter: Reveals tenderness on the left  Examination of the Iliotibial band: Reveals tenderness on the left  Neurological:   Patient is alert and oriented to person, place, and time.   Speech: Normal.   Cortical function: Normal mental status.   Reflex Scores:  Right patellar: 1+  Left patellar: 1+  Right Achilles: 1+  Left Achilles: 1+  Motor strength: 5/5  Motor Tone: Normal  Involuntary movements: None.   Superficial/Primitive Reflexes: Primitive reflexes were absent.   Right David: Absent  Left David: Absent  Right ankle clonus: Absent  Left ankle clonus: Absent   Babinsky: Absent  Long tract signs: Negative. Straight leg raising test: Negative. Femoral stretch sign: Negative.   Sensory exam: Intact to light touch, intact pain and temperature sensation, intact vibration sensation and normal proprioception.   Coordination: Normal finger to nose and heel to shin. Normal balance. Romberg's sign: Negative    Skin and subcutaneous tissue: Skin is warm and intact. No rash noted. No cyanosis.   Psychiatric: Judgment and insight: Normal. Recent and remote memory: Intact. Mood and affect: Normal.       ASSESSMENT:   1. Spondylosis of lumbar region  without myelopathy or radiculopathy    2. Greater trochanteric bursitis of left hip    3. Ligamentum flavum hypertrophy    4. Myofascial pain    5. Leg length discrepancy          PLAN/MEDICAL DECISION MAKING:  Ms. Dusty Zepeda, 54 y.o. female originally referred by Dr. Nadir Moe in consultation for chronic intractable lower back pain.  MRI of the lumbar spine w/o contrast on 08/18/2023, revealed grade 1 anterolisthesis of L3 on L4 and L4 on L5. Multilevel spondylosis with DDD, facet hypertrophy and ligamentum flavum hypertrophy. At L3-L4: Moderate spinal canal stenosis and moderate bilateral neural foraminal stenosis. L4-L5: Mild to moderate spinal canal stenosis and bilateral neuroforaminal stenosis. L5-S1: No significant canal stenosis. Moderate bilateral neuroforaminal stenosis. Flexion and extension X-rays of the lumbar spine on 06/08/2023. There is 2 mm anterolisthesis of L3 on L4 and trace degenerative anterolisthesis of L4 on L5. No dynamic instability with flexion and extension. Patient failed to obtain pain relief with conservative measures for including oral analgesics, topical analgesics, ice, heat, physical therapy, physical therapist directed home exercise program HEP (ongoing), Stretch Zone, therapeutic massage, independent exercise program, to name a few. I had a lengthy conversation with Dusty Zepeda regarding her chronic pain condition and potential therapeutic options including risks, benefits, alternative therapies, to name a few. We have discussed using a stepwise approach starting with the least intense level of care as determined by the extent required to diagnose and or treat a patient's condition. The proposed treatments are consistent with the patient's medical condition and known to be safe and effective by current guidelines and the standard of care. The duration and frequency proposed are considered appropriate for the service in accordance with accepted  standards of medical practice for the diagnosis and treatment of the patient's condition and intended to improve the patient's level of function. These services will be furnished in a setting appropriate to the patient's medical needs and condition. Therefore, I have proposed the following plan:  1. Interventional pain management measures: Patient does not take blood thinners  A. Left greater trochanteric bursitis: Patient will be scheduled for left greater trochanteric bursa injection under ultrasound and fluoroscopic guidance. We have also discussed PRP.  B. Chronic Mechanical Axial Lower Back Pain: Patient will be scheduled for bilateral lumbar medial branch rhizotomies at L2, L3, L4; for bilateral lumbar facet joints at L3-L4, and L4-L5 combined with a left greater trochanteric bursa injection to maximize her rehabilitation.   2. Diagnostic studies: None indicated at this time.  3. Pharmacological measures: Reviewed and discussed;   A. Patient takes Advil PRN. Patient also takes buspirone, venlafaxine XR  4. Long-term rehabilitation efforts:  A. The patient does not have a history of falls. I did complete a risk assessment for falls.   B. Patient will start a comprehensive physical therapy program for Alter-G, core strengthening, gait and balance training, neurodynamics, ultrasound, ASTYM, E-STIM, myofascial release, cupping, dry needling, home exercise program  C. Continue low impact exercise program such as water therapy, Pilates, daily walks for fitness  D. Continue stretching program at stretch zone  E. Contrast therapy: Apply ice-packs for 15-20 minutes, followed by heating pads for 15-20 minutes to affected area   F. Patient's Body mass index is 29 kg/m². Patient counseled on the importance of weight loss to help with overall health and pain control.   REYNOLD Benitezele Mert Zepeda  reports that she has never smoked. She has never used smokeless tobacco.  5. The patient has been instructed to contact my office  with any questions or difficulties. The patient understands the plan and agrees to proceed accordingly.        Pain Medications              meloxicam (MOBIC) 15 MG tablet     venlafaxine XR (EFFEXOR-XR) 75 MG 24 hr capsule              Orders Placed This Encounter   Procedures    External Facility Surgical/Procedural Request     Standing Status:   Future     Expected Date:   6/12/2025     Expiration Date:   6/12/2026     Provider:   JESÚS MCKINNON [[id number]]     Requested Location:   [address] [place] [address]     Procedure/ Order for Consent:   bilateral lumbar medial branch rhizotomies at L2, L3, L4; for bilateral lumbar facet joints at L3-L4, and L4-L5 combined with a left greater trochanteric bursa injection     Laterality:   Bilateral     Anesthesia type:   Choice [220]     Pre-op diagnosis:   Spondylosis of lumbar region without myelopathy or radiculopathy    Ambulatory Referral for Yoga/Pilates     Referral Priority:   Routine     Referral Type:   Pain Management     Referral Reason:   Specialty Services Required     Number of Visits Requested:   1        Please note that portions of this note were completed with a voice recognition program.   Any copied data in any portion of my note has been reviewed by myself and accurate.     The 21st Century Cures Act makes medical notes like this available to patients in the interest of transparency. This is a medical document intended as peer to peer communication. It is written in medical language and may contain abbreviations or verbiage that are unfamiliar. It may appear blunt or direct. Medical documents are intended to carry relevant information, facts as evident, and the clinical opinion of the practitioner.     EDMUND Bonilla    Patient Care Team:  Pam Calero MD as PCP - General (Internal Medicine)  David, Merlin Pickens MD as Referring Physician (Internal Medicine)  Don Torres MD as Consulting Physician (Endocrinology)  Jesús Mckinnon,  MD as Consulting Physician (Pain Medicine)     No orders of the defined types were placed in this encounter.        Future Appointments   Date Time Provider Department Center   6/26/2025  1:00 PM Don Torres MD [facility]

## 2025-06-23 ENCOUNTER — OUTSIDE FACILITY SERVICE (OUTPATIENT)
Dept: PAIN MEDICINE | Facility: CLINIC | Age: 55
End: 2025-06-23
Payer: COMMERCIAL

## 2025-06-23 ENCOUNTER — DOCUMENTATION (OUTPATIENT)
Dept: PAIN MEDICINE | Facility: CLINIC | Age: 55
End: 2025-06-23

## 2025-06-23 PROCEDURE — 64636 DESTROY L/S FACET JNT ADDL: CPT | Performed by: ANESTHESIOLOGY

## 2025-06-23 PROCEDURE — 20610 DRAIN/INJ JOINT/BURSA W/O US: CPT | Performed by: ANESTHESIOLOGY

## 2025-06-23 PROCEDURE — 64635 DESTROY LUMB/SAC FACET JNT: CPT | Performed by: ANESTHESIOLOGY

## 2025-06-23 NOTE — PROGRESS NOTES
PROCEDURE DATE: 06/23/2025      PREOPERATIVE DIAGNOSES:  1. Spondylosis of lumbar region without myelopathy or radiculopathy   2. Greater trochanteric bursitis of left hip   3. Leg length discrepancy   4. Myofascial pain     POSTOPERATIVE DIAGNOSES:  1. Spondylosis of lumbar region without myelopathy or radiculopathy   2. Greater trochanteric bursitis of left hip   3. Leg length discrepancy   4. Myofascial pain     PROCEDURES PERFORMED:   Bilateral lumbar medial branch rhizotomies at L2, L3, L4; for bilateral lumbar facet joints at L3-L4, and L4-L5  Left greater trochanteric bursa injection with steroids    ANESTHESIA: Local anesthesia plus IV sedation    COMPLICATIONS: None    EBL: 0    MEDICAL NECESSITY: A comprehensive evaluation, including history and physical exam and pertinent physiological and functional assessment, was performed.  The patient presents with intractable pain due to the diagnoses listed above. The patient has failed to respond to conservative modalities including the impact of patient's moderate-to-severe pain contributing to significant impairment in daily activities, ADLs, and a negative impact on quality of life, as referenced under HPI. Supporting diagnostic studies of patient's chronic pain condition have been reviewed. We have discussed using a stepwise approach starting with the least intense level of treatment or service as determined by the extent required to diagnose and or treat a patient's condition. The treatments proposed are consistent with the patient's medical condition and are known to be as safe and effective by current guidelines and standard of care. The duration and frequency proposed are considered appropriate for the service in accordance with accepted standards of medical practice for the diagnosis and or treatment of the patient's condition and or intended to improve the patient's level of function. We have documented outcome in physical and functional status for  repeating interventions only upon return of pain and or deterioration in functional status. See OV note.      PROCEDURE SUMMARY: After explaining all the risks and benefits of the procedure, an informed consent was obtained.  The patient's surgical site was confirmed with the patient and marked in the holding room accordingly. The patient was transferred to the procedure room and placed on the operating room table in the prone position. Time out was completed. Non-invasive monitors were applied. Administration of IV sedation was performed by a designated procedure room nurse, who monitored the patient's level of consciousness and physiological status. Pertinent information was reported on   a sedation flow sheet, which is part of the patient's permanent medical records. Start sedation time: 07:33 AM. The patient's vital signs remained within normal limits. The lumbosacral spine area was prepped and draped in a sterile fashion. Using C-arm fluoroscopic guidance, the most superior and medial aspects of the transverse processes of L3, L4, L5, on both sides (targets) were identified. The skin and subcutaneous tissues overlying the targets at the above-mentioned levels were anesthetized with ten ml of 1% lidocaine followed by fourteen ml of 0.25% bupivacaine with epinephrine 1:200.000. Using 20-gauge Abbott cannulas with 1-cm uninsulated curved tips, the cannulas were advanced to contact the targets at the above-mentioned levels. AP, oblique and lateral x-ray views were obtained and confirmed appropriate cannula placement. X-rays were obtained and scanned in the patient's chart. Aspiration was negative for blood and/or CSF. Sensory thresholds were obtained at 0.25 millivolts, and motor thresholds at 0.5 millivolts. Motor and sensory stimulation were performed up to 3 mV. At no time was there any evidence of radicular stimulation or elicitation of paresthesia in a radicular distribution. Bilateral lumbar medial branch  rhizotomies at L2, L3, L4; for bilateral lumbar facet joints at L3-L4, and L4-L5, at 80 degrees Celsius for 90 seconds per lumbar level. The cannulas were flushed with 1 ml of 0.25% bupivacaine with epinephrine 1:200.000 in 1% dextrose per lumbar level. The left greater trochanter (target) was visualized. The skin and subcutaneous tissues overlying the target were anesthetized with 5 ml of 0.25% bupivacaine. A 22-gauge styleted needle was advanced to contact the target. Aspiration was negative. Six ml of 0.25% bupivacaine with 20 mg of Depo-Medrol were injected. Fluoroscopy was utilized using low-dose of radiation applying collimation, pulsed mode, and shielding following ALARA recommendations. Fluoroscopy time: 36 seconds. End sedation time: 07:47 AM. The patient tolerated the procedure well and without incident.  Upon completion of the procedure, the patient was transferred to the recovery room in stable condition. The [facility] was [facility] neurologically intact and with appropriate discharge instructions. Pain level before procedure: 10/10. Pain level after procedure: 4/10.    PLAN:  Follow up with Yue in 20 weeks. We have discussed left greater trochanteric bursa injection with PRP.  The patient has been instructed to contact my office with any questions or difficulties. The patient understands the plan and agrees to proceed accordingly.      Please note that portions of this note were completed with a voice recognition program.         Signatures  Electronically signed by: Isrrael Meeks M.D.; JUNE 23, 2025, 09:33 AM EST (Author)

## 2025-06-24 ENCOUNTER — TELEPHONE (OUTPATIENT)
Dept: PAIN MEDICINE | Facility: CLINIC | Age: 55
End: 2025-06-24
Payer: COMMERCIAL

## 2025-06-24 NOTE — TELEPHONE ENCOUNTER
"  Hub staff attempted to follow warm transfer process and was unsuccessful     Caller: Dusty Zepeda \"Dr. Montano\"    Relationship to patient: Self    Best call back number:[phone]      Patient is needing: CALLBACK - HAS POST OP QUESTIONS  "

## 2025-06-25 ENCOUNTER — TELEPHONE (OUTPATIENT)
Age: 55
End: 2025-06-25
Payer: COMMERCIAL

## 2025-06-25 NOTE — TELEPHONE ENCOUNTER
Attempted to contact patient, no answer. LVM to return call to office and provided call back number.      HUB relay:     Please schedule a 6mnth f/u, post rhizotomies with Yue, at pt preferred location.

## 2025-06-26 ENCOUNTER — OFFICE VISIT (OUTPATIENT)
Dept: ENDOCRINOLOGY | Facility: CLINIC | Age: 55
End: 2025-06-26
Payer: COMMERCIAL

## 2025-06-26 VITALS
SYSTOLIC BLOOD PRESSURE: 110 MMHG | HEIGHT: 69 IN | OXYGEN SATURATION: 99 % | WEIGHT: 193 LBS | BODY MASS INDEX: 28.58 KG/M2 | DIASTOLIC BLOOD PRESSURE: 68 MMHG | HEART RATE: 67 BPM

## 2025-06-26 DIAGNOSIS — E03.9 HYPOTHYROIDISM (ACQUIRED): Primary | ICD-10-CM

## 2025-06-26 DIAGNOSIS — E04.1 NON-TOXIC UNINODULAR GOITER: ICD-10-CM

## 2025-06-26 LAB — TSH SERPL DL<=0.05 MIU/L-ACNC: 2.73 UIU/ML (ref 0.27–4.2)

## 2025-06-26 PROCEDURE — 84443 ASSAY THYROID STIM HORMONE: CPT | Performed by: INTERNAL MEDICINE

## 2025-06-26 NOTE — PROGRESS NOTES
"     Office Note      Date: 2025  Patient Name: Dusty Zepeda  MRN: [id number]  : 1970    Chief Complaint   Patient presents with    Hypothyroidism       History of Present Illness:   Dusty Zepeda is a 54 y.o. female who presents for Hypothyroidism    She remains on T4 50mcg qd. She reports taking this correctly and regularly. She denies sxs of hypo- or hyperthyroidism at this time. She denies any changes in her neck. She denies compressive sxs.     MNG - partial thyroidectomy ; FNA  - atypical but ThyraMIR highly likely benign    She had rhizotomy and steroid injection 3 days ago.      Subjective      Review of Systems:   Review of Systems   Constitutional: Negative.    Cardiovascular: Negative.    Gastrointestinal: Negative.    Endocrine: Negative.        The following portions of the patient's history were reviewed and updated as appropriate: allergies, current medications, past family history, past medical history, past social history, past surgical history, and problem list.    Objective     Visit Vitals  /68 (BP Location: Left arm, Patient Position: Sitting, Cuff Size: Adult)   Pulse 67   Ht 175.3 cm (69\")   Wt 87.5 kg (193 lb)   SpO2 99%   BMI 28.50 kg/m²       Physical Exam:  Physical Exam  Constitutional:       Appearance: Normal appearance.   Neck:      Thyroid: No thyroid mass, thyromegaly or thyroid tenderness.   Lymphadenopathy:      Cervical: No cervical adenopathy.   Neurological:      Mental Status: She is alert.         Labs:    TSH  No results found for: \"TSHBASE\"     Free T4  No results found for: \"FREET4\"    T3  No results found for: \"V6RWLWP\"      TPO  No results found for: \"THYROIDAB\"    TG AB  No results found for: \"THGAB\"    TG  No results found for: \"THYROGLB\"    CBC w/DIFF  Lab Results   Component Value Date    WBC 4.14 2021    RBC 4.51 2021    HGB 14.3 2021    HCT 42.0 2021    MCV 93.1 2021    MCH 31.7 2021    " MCHC 34.0 02/24/2021    RDW 11.8 (L) 02/24/2021    RDWSD 40.0 02/24/2021    MPV 9.8 02/24/2021     02/24/2021           Assessment / Plan      Assessment & Plan:  Diagnoses and all orders for this visit:    1. Hypothyroidism (acquired) (Primary)  Assessment & Plan:  Continue levothyroxine.  Check TSH today.    Had steroid injection 3 days ago..    Orders:  -     TSH; Future    2. Non-toxic uninodular goiter  Assessment & Plan:  Neck u/s was stable last visit.  Neck exam okay.  Plan for another neck u/s in about 18 months.        Current Outpatient Medications   Medication Instructions    busPIRone (BUSPAR) 5 mg, 3 Times Daily    EPINEPHrine (EPIPEN) 0.3 MG/0.3ML solution auto-injector injection     estradiol (ESTRACE) 1 MG tablet estradiol 1 mg tablet    levothyroxine (SYNTHROID) 50 mcg, Oral, Daily    meloxicam (MOBIC) 15 MG tablet     Mounjaro 5 MG/0.5ML solution pen-injector 5 mL, Weekly    triamterene-hydrochlorothiazide (DYAZIDE) 37.5-25 MG per capsule 1 capsule, Daily    venlafaxine XR (EFFEXOR-XR) 75 MG 24 hr capsule No dose, route, or frequency recorded.      Return in about 6 months (around 12/26/2025) for Recheck with TSH.    Electronically signed by: Don Torres MD  06/26/2025

## 2025-06-27 ENCOUNTER — RESULTS FOLLOW-UP (OUTPATIENT)
Dept: ENDOCRINOLOGY | Facility: CLINIC | Age: 55
End: 2025-06-27
Payer: COMMERCIAL

## 2025-07-09 DIAGNOSIS — M47.816 SPONDYLOSIS OF LUMBAR REGION WITHOUT MYELOPATHY OR RADICULOPATHY: Primary | ICD-10-CM

## 2025-07-21 ENCOUNTER — HOSPITAL ENCOUNTER (OUTPATIENT)
Facility: HOSPITAL | Age: 55
Discharge: HOME | End: 2025-07-21
Admitting: NURSE PRACTITIONER
Payer: COMMERCIAL

## 2025-07-21 DIAGNOSIS — M47.816 SPONDYLOSIS OF LUMBAR REGION WITHOUT MYELOPATHY OR RADICULOPATHY: ICD-10-CM

## 2025-07-21 PROCEDURE — 72148 MRI LUMBAR SPINE W/O DYE: CPT

## 2025-08-05 ENCOUNTER — OFFICE VISIT (OUTPATIENT)
Dept: ORTHOPEDIC SURGERY | Facility: CLINIC | Age: 55
End: 2025-08-05
Payer: COMMERCIAL

## 2025-08-05 VITALS
BODY MASS INDEX: 29.09 KG/M2 | WEIGHT: 196.4 LBS | HEIGHT: 69 IN | SYSTOLIC BLOOD PRESSURE: 120 MMHG | DIASTOLIC BLOOD PRESSURE: 74 MMHG

## 2025-08-05 DIAGNOSIS — M70.61 TROCHANTERIC BURSITIS OF BOTH HIPS: ICD-10-CM

## 2025-08-05 DIAGNOSIS — M25.552 PAIN OF LEFT HIP JOINT: Primary | ICD-10-CM

## 2025-08-05 DIAGNOSIS — M70.62 TROCHANTERIC BURSITIS OF BOTH HIPS: ICD-10-CM

## 2025-08-05 DIAGNOSIS — M54.50 ACUTE LEFT-SIDED LOW BACK PAIN WITHOUT SCIATICA: ICD-10-CM

## 2025-08-05 DIAGNOSIS — R20.0 NUMBNESS OF LEFT ANTERIOR THIGH: ICD-10-CM

## 2025-08-05 PROCEDURE — 99214 OFFICE O/P EST MOD 30 MIN: CPT | Performed by: ORTHOPAEDIC SURGERY

## 2025-08-05 RX ORDER — CLOTRIMAZOLE AND BETAMETHASONE DIPROPIONATE 10; .64 MG/G; MG/G
1 CREAM TOPICAL AS NEEDED
COMMUNITY
Start: 2025-06-25

## 2025-08-14 ENCOUNTER — OFFICE VISIT (OUTPATIENT)
Dept: NEUROSURGERY | Facility: CLINIC | Age: 55
End: 2025-08-14
Payer: COMMERCIAL

## 2025-08-14 VITALS
WEIGHT: 193.8 LBS | HEIGHT: 69 IN | SYSTOLIC BLOOD PRESSURE: 110 MMHG | DIASTOLIC BLOOD PRESSURE: 78 MMHG | BODY MASS INDEX: 28.71 KG/M2

## 2025-08-14 DIAGNOSIS — M21.70 LEG LENGTH DISCREPANCY: ICD-10-CM

## 2025-08-14 DIAGNOSIS — M51.372 DEGENERATION OF INTERVERTEBRAL DISC OF LUMBOSACRAL REGION WITH DISCOGENIC BACK PAIN AND LOWER EXTREMITY PAIN: ICD-10-CM

## 2025-08-14 DIAGNOSIS — M48.061 LUMBAR FORAMINAL STENOSIS: Primary | ICD-10-CM

## 2025-08-14 DIAGNOSIS — M79.18 MYOFASCIAL PAIN: ICD-10-CM

## 2025-08-14 PROCEDURE — 99204 OFFICE O/P NEW MOD 45 MIN: CPT | Performed by: PHYSICIAN ASSISTANT

## 2025-08-15 ENCOUNTER — HOSPITAL ENCOUNTER (OUTPATIENT)
Facility: HOSPITAL | Age: 55
Discharge: HOME | End: 2025-08-15
Payer: COMMERCIAL

## 2025-08-15 DIAGNOSIS — M79.18 MYOFASCIAL PAIN: ICD-10-CM

## 2025-08-15 DIAGNOSIS — M21.70 LEG LENGTH DISCREPANCY: ICD-10-CM

## 2025-08-15 DIAGNOSIS — M51.372 DEGENERATION OF INTERVERTEBRAL DISC OF LUMBOSACRAL REGION WITH DISCOGENIC BACK PAIN AND LOWER EXTREMITY PAIN: ICD-10-CM

## 2025-08-15 DIAGNOSIS — M48.061 LUMBAR FORAMINAL STENOSIS: ICD-10-CM

## 2025-08-15 PROCEDURE — 73502 X-RAY EXAM HIP UNI 2-3 VIEWS: CPT

## 2025-08-15 PROCEDURE — 72110 X-RAY EXAM L-2 SPINE 4/>VWS: CPT

## 2025-08-21 ENCOUNTER — HOSPITAL ENCOUNTER (OUTPATIENT)
Dept: NEUROLOGY | Facility: HOSPITAL | Age: 55
Discharge: HOME | End: 2025-08-21
Admitting: NURSE PRACTITIONER
Payer: COMMERCIAL

## 2025-08-21 DIAGNOSIS — M47.816 SPONDYLOSIS OF LUMBAR REGION WITHOUT MYELOPATHY OR RADICULOPATHY: ICD-10-CM

## 2025-08-21 PROCEDURE — 95886 MUSC TEST DONE W/N TEST COMP: CPT

## 2025-08-21 PROCEDURE — 95910 NRV CNDJ TEST 7-8 STUDIES: CPT

## 2025-08-26 ENCOUNTER — OFFICE VISIT (OUTPATIENT)
Dept: NEUROSURGERY | Facility: CLINIC | Age: 55
End: 2025-08-26
Payer: COMMERCIAL

## 2025-08-26 VITALS
WEIGHT: 186 LBS | BODY MASS INDEX: 27.55 KG/M2 | SYSTOLIC BLOOD PRESSURE: 124 MMHG | HEIGHT: 69 IN | DIASTOLIC BLOOD PRESSURE: 84 MMHG

## 2025-08-26 DIAGNOSIS — M79.18 MYOFASCIAL PAIN: ICD-10-CM

## 2025-08-26 DIAGNOSIS — M51.372 DEGENERATION OF INTERVERTEBRAL DISC OF LUMBOSACRAL REGION WITH DISCOGENIC BACK PAIN AND LOWER EXTREMITY PAIN: Primary | ICD-10-CM

## 2025-08-26 DIAGNOSIS — M48.061 LUMBAR FORAMINAL STENOSIS: ICD-10-CM

## 2025-08-26 DIAGNOSIS — M21.70 LEG LENGTH DISCREPANCY: ICD-10-CM

## 2025-08-26 PROCEDURE — 99214 OFFICE O/P EST MOD 30 MIN: CPT | Performed by: PHYSICIAN ASSISTANT
